# Patient Record
Sex: MALE | Race: WHITE | NOT HISPANIC OR LATINO | Employment: FULL TIME | URBAN - METROPOLITAN AREA
[De-identification: names, ages, dates, MRNs, and addresses within clinical notes are randomized per-mention and may not be internally consistent; named-entity substitution may affect disease eponyms.]

---

## 2018-04-10 ENCOUNTER — OFFICE VISIT - HEALTHEAST (OUTPATIENT)
Dept: FAMILY MEDICINE | Facility: CLINIC | Age: 58
End: 2018-04-10

## 2018-04-10 DIAGNOSIS — I10 HTN (HYPERTENSION): ICD-10-CM

## 2018-04-10 DIAGNOSIS — Z12.11 SCREEN FOR COLON CANCER: ICD-10-CM

## 2018-04-10 DIAGNOSIS — E78.5 HYPERLIPIDEMIA, UNSPECIFIED HYPERLIPIDEMIA TYPE: ICD-10-CM

## 2018-04-10 DIAGNOSIS — Z00.00 ROUTINE GENERAL MEDICAL EXAMINATION AT A HEALTH CARE FACILITY: ICD-10-CM

## 2018-04-10 LAB
CHOLEST SERPL-MCNC: 163 MG/DL
FASTING STATUS PATIENT QL REPORTED: YES
FASTING STATUS PATIENT QL REPORTED: YES
GLUCOSE BLD-MCNC: 101 MG/DL (ref 70–125)
HDLC SERPL-MCNC: 29 MG/DL
LDLC SERPL CALC-MCNC: 108 MG/DL
PSA SERPL-MCNC: 2.6 NG/ML (ref 0–3.5)
TRIGL SERPL-MCNC: 131 MG/DL

## 2018-04-10 ASSESSMENT — MIFFLIN-ST. JEOR: SCORE: 1899.88

## 2018-04-12 ENCOUNTER — COMMUNICATION - HEALTHEAST (OUTPATIENT)
Dept: FAMILY MEDICINE | Facility: CLINIC | Age: 58
End: 2018-04-12

## 2018-04-13 ENCOUNTER — COMMUNICATION - HEALTHEAST (OUTPATIENT)
Dept: FAMILY MEDICINE | Facility: CLINIC | Age: 58
End: 2018-04-13

## 2018-04-13 DIAGNOSIS — N52.9 ED (ERECTILE DYSFUNCTION): ICD-10-CM

## 2018-05-24 ENCOUNTER — AMBULATORY - HEALTHEAST (OUTPATIENT)
Dept: NURSING | Facility: CLINIC | Age: 58
End: 2018-05-24

## 2020-08-10 ENCOUNTER — COMMUNICATION - HEALTHEAST (OUTPATIENT)
Dept: SCHEDULING | Facility: CLINIC | Age: 60
End: 2020-08-10

## 2020-08-12 ENCOUNTER — COMMUNICATION - HEALTHEAST (OUTPATIENT)
Dept: SCHEDULING | Facility: CLINIC | Age: 60
End: 2020-08-12

## 2020-08-19 ENCOUNTER — RECORDS - HEALTHEAST (OUTPATIENT)
Dept: ADMINISTRATIVE | Facility: OTHER | Age: 60
End: 2020-08-19

## 2020-08-24 ENCOUNTER — RECORDS - HEALTHEAST (OUTPATIENT)
Dept: ADMINISTRATIVE | Facility: OTHER | Age: 60
End: 2020-08-24

## 2020-08-25 ENCOUNTER — RECORDS - HEALTHEAST (OUTPATIENT)
Dept: ADMINISTRATIVE | Facility: OTHER | Age: 60
End: 2020-08-25

## 2020-08-28 ENCOUNTER — RECORDS - HEALTHEAST (OUTPATIENT)
Dept: ADMINISTRATIVE | Facility: OTHER | Age: 60
End: 2020-08-28

## 2020-09-09 ENCOUNTER — RECORDS - HEALTHEAST (OUTPATIENT)
Dept: ADMINISTRATIVE | Facility: OTHER | Age: 60
End: 2020-09-09

## 2020-09-10 ENCOUNTER — AMBULATORY - HEALTHEAST (OUTPATIENT)
Dept: FAMILY MEDICINE | Facility: CLINIC | Age: 60
End: 2020-09-10

## 2020-09-10 RX ORDER — GUAIFENESIN 600 MG/1
600 TABLET, EXTENDED RELEASE ORAL
Status: SHIPPED | COMMUNITY
Start: 2020-09-08 | End: 2021-07-30

## 2020-09-10 RX ORDER — CALCIUM CARBONATE 500(1250)
500 TABLET ORAL
Status: SHIPPED | COMMUNITY
Start: 2020-08-25 | End: 2021-07-30

## 2020-09-10 RX ORDER — PANTOPRAZOLE SODIUM 40 MG/1
40 TABLET, DELAYED RELEASE ORAL
Status: SHIPPED | COMMUNITY
Start: 2020-09-08 | End: 2021-07-30

## 2020-09-10 RX ORDER — POLYETHYLENE GLYCOL 3350 17 G/17G
17 POWDER, FOR SOLUTION ORAL
Status: SHIPPED | COMMUNITY
Start: 2020-09-08 | End: 2021-07-30

## 2020-09-10 RX ORDER — MIRTAZAPINE 7.5 MG/1
7.5 TABLET, FILM COATED ORAL
Status: SHIPPED | COMMUNITY
Start: 2020-09-09 | End: 2021-07-30

## 2020-09-10 RX ORDER — DEXTROMETHORPHAN HBR. AND GUAIFENESIN 10; 100 MG/5ML; MG/5ML
10 SOLUTION ORAL PRN
Status: SHIPPED | COMMUNITY
Start: 2020-09-08 | End: 2021-07-30

## 2020-09-10 RX ORDER — ACETAMINOPHEN 325 MG/1
325-650 TABLET ORAL PRN
Status: SHIPPED | COMMUNITY
Start: 2020-08-24 | End: 2022-11-14

## 2020-09-11 ENCOUNTER — OFFICE VISIT - HEALTHEAST (OUTPATIENT)
Dept: FAMILY MEDICINE | Facility: CLINIC | Age: 60
End: 2020-09-11

## 2020-09-11 DIAGNOSIS — I77.810 MILD ASCENDING AORTA DILATATION (H): ICD-10-CM

## 2020-09-11 DIAGNOSIS — E78.5 HYPERLIPIDEMIA, UNSPECIFIED HYPERLIPIDEMIA TYPE: ICD-10-CM

## 2020-09-11 DIAGNOSIS — I10 ESSENTIAL HYPERTENSION: ICD-10-CM

## 2020-09-11 DIAGNOSIS — I35.1 MODERATE AORTIC REGURGITATION: ICD-10-CM

## 2020-09-11 DIAGNOSIS — S06.5XAA SDH (SUBDURAL HEMATOMA) (H): ICD-10-CM

## 2020-09-11 DIAGNOSIS — I63.9 ACUTE ISCHEMIC STROKE (H): ICD-10-CM

## 2020-09-11 DIAGNOSIS — I82.4Z2 ACUTE DEEP VEIN THROMBOSIS (DVT) OF DISTAL VEIN OF LEFT LOWER EXTREMITY (H): ICD-10-CM

## 2020-09-14 ENCOUNTER — COMMUNICATION - HEALTHEAST (OUTPATIENT)
Dept: FAMILY MEDICINE | Facility: CLINIC | Age: 60
End: 2020-09-14

## 2020-09-23 ENCOUNTER — OFFICE VISIT - HEALTHEAST (OUTPATIENT)
Dept: CARDIOLOGY | Facility: CLINIC | Age: 60
End: 2020-09-23

## 2020-09-23 DIAGNOSIS — I77.810 ASCENDING AORTA DILATION (H): ICD-10-CM

## 2020-09-23 DIAGNOSIS — I35.1 NONRHEUMATIC AORTIC VALVE INSUFFICIENCY: ICD-10-CM

## 2020-09-23 DIAGNOSIS — I10 ESSENTIAL HYPERTENSION: ICD-10-CM

## 2020-09-23 DIAGNOSIS — E78.5 DYSLIPIDEMIA, GOAL LDL BELOW 100: ICD-10-CM

## 2020-09-23 RX ORDER — CARVEDILOL 25 MG/1
25 TABLET ORAL 2 TIMES DAILY
Qty: 60 TABLET | Refills: 11 | Status: SHIPPED | OUTPATIENT
Start: 2020-09-23 | End: 2021-10-20

## 2020-10-12 ENCOUNTER — OFFICE VISIT - HEALTHEAST (OUTPATIENT)
Dept: FAMILY MEDICINE | Facility: CLINIC | Age: 60
End: 2020-10-12

## 2020-10-12 DIAGNOSIS — I10 ESSENTIAL HYPERTENSION: ICD-10-CM

## 2020-10-12 DIAGNOSIS — S06.5XAA SDH (SUBDURAL HEMATOMA) (H): ICD-10-CM

## 2020-10-12 DIAGNOSIS — I63.9 ACUTE ISCHEMIC STROKE (H): ICD-10-CM

## 2020-10-12 DIAGNOSIS — I82.4Z2 ACUTE DEEP VEIN THROMBOSIS (DVT) OF DISTAL VEIN OF LEFT LOWER EXTREMITY (H): ICD-10-CM

## 2020-10-12 DIAGNOSIS — I35.1 MODERATE AORTIC REGURGITATION: ICD-10-CM

## 2020-10-12 DIAGNOSIS — I77.810 MILD ASCENDING AORTA DILATATION (H): ICD-10-CM

## 2020-10-12 DIAGNOSIS — E78.5 HYPERLIPIDEMIA, UNSPECIFIED HYPERLIPIDEMIA TYPE: ICD-10-CM

## 2020-11-05 ENCOUNTER — COMMUNICATION - HEALTHEAST (OUTPATIENT)
Dept: FAMILY MEDICINE | Facility: CLINIC | Age: 60
End: 2020-11-05

## 2020-11-05 DIAGNOSIS — I63.9 ACUTE ISCHEMIC STROKE (H): ICD-10-CM

## 2020-11-05 RX ORDER — AMLODIPINE BESYLATE 5 MG/1
5 TABLET ORAL 2 TIMES DAILY
Qty: 180 TABLET | Refills: 3 | Status: SHIPPED | OUTPATIENT
Start: 2020-11-05 | End: 2021-07-30

## 2020-11-05 RX ORDER — ATORVASTATIN CALCIUM 40 MG/1
80 TABLET, FILM COATED ORAL DAILY
Qty: 180 TABLET | Refills: 3 | Status: SHIPPED | OUTPATIENT
Start: 2020-11-05 | End: 2021-07-30

## 2020-11-08 ENCOUNTER — COMMUNICATION - HEALTHEAST (OUTPATIENT)
Dept: FAMILY MEDICINE | Facility: CLINIC | Age: 60
End: 2020-11-08

## 2020-11-08 DIAGNOSIS — E78.5 HYPERLIPIDEMIA, UNSPECIFIED HYPERLIPIDEMIA TYPE: ICD-10-CM

## 2020-11-09 RX ORDER — ATORVASTATIN CALCIUM 80 MG/1
80 TABLET, FILM COATED ORAL AT BEDTIME
Qty: 90 TABLET | Refills: 3 | Status: SHIPPED | OUTPATIENT
Start: 2020-11-09 | End: 2021-10-20

## 2020-11-20 ENCOUNTER — RECORDS - HEALTHEAST (OUTPATIENT)
Dept: VASCULAR ULTRASOUND | Facility: CLINIC | Age: 60
End: 2020-11-20

## 2020-11-20 DIAGNOSIS — I82.4Z2 ACUTE EMBOLISM AND THROMBOSIS OF UNSPECIFIED DEEP VEINS OF LEFT DISTAL LOWER EXTREMITY (H): ICD-10-CM

## 2021-03-28 ENCOUNTER — COMMUNICATION - HEALTHEAST (OUTPATIENT)
Dept: FAMILY MEDICINE | Facility: CLINIC | Age: 61
End: 2021-03-28

## 2021-05-26 ENCOUNTER — RECORDS - HEALTHEAST (OUTPATIENT)
Dept: ADMINISTRATIVE | Facility: CLINIC | Age: 61
End: 2021-05-26

## 2021-06-01 VITALS — BODY MASS INDEX: 34.36 KG/M2 | WEIGHT: 240 LBS | HEIGHT: 70 IN

## 2021-06-05 VITALS
DIASTOLIC BLOOD PRESSURE: 72 MMHG | OXYGEN SATURATION: 99 % | BODY MASS INDEX: 35.15 KG/M2 | SYSTOLIC BLOOD PRESSURE: 130 MMHG | WEIGHT: 252 LBS | HEART RATE: 62 BPM

## 2021-06-05 VITALS
OXYGEN SATURATION: 97 % | SYSTOLIC BLOOD PRESSURE: 142 MMHG | HEART RATE: 71 BPM | DIASTOLIC BLOOD PRESSURE: 72 MMHG | BODY MASS INDEX: 33.89 KG/M2 | RESPIRATION RATE: 18 BRPM | WEIGHT: 243 LBS

## 2021-06-05 VITALS
WEIGHT: 248 LBS | HEART RATE: 72 BPM | SYSTOLIC BLOOD PRESSURE: 144 MMHG | OXYGEN SATURATION: 98 % | BODY MASS INDEX: 34.59 KG/M2 | DIASTOLIC BLOOD PRESSURE: 76 MMHG

## 2021-06-10 NOTE — TELEPHONE ENCOUNTER
Pt's significant other, Katiana, calls on his behalf. Consent to communicate with Katiana is in the chart.    Concerned about sudden inability to swallow. Patient woke up around 4am feeling congested in the nose/throat area. He denies airway swelling or trouble breathing. Does not have respiratory difficulty at this time. The main issue is severe inability to swallow. The patient is unable to swallow anything. Cannot even swallow a sip of water. Is having trouble managing his own saliva secretions.    I directed Katiana to take patient to the ER now to be seen. The patient should be assessed promptly for his symptoms. Katiana verbalizes understanding and will take patient to the ER to be evaluated.    Soila Roberts RN    Reason for Disposition    SEVERE difficulty swallowing (e.g., drooling or spitting, can't swallow water)    Additional Information    Negative: [1] Severe difficulty swallowing (e.g., drooling or spitting) AND [2] started suddenly after taking a medicine or allergic food    Negative: Wheezing, stridor, hoarseness, or difficulty breathing    Negative: [1] Swollen tongue AND [2] sudden onset    Negative: Sounds like a life-threatening emergency to the triager    Protocols used: SWALLOWING DIFFICULTY-A-AH

## 2021-06-11 NOTE — TELEPHONE ENCOUNTER
Forms Request  Name of form/paperwork: Other:  Healthcare certification form  Have you been seen for this request: Yes:  9/11/20  Do we have the form: No. This form will be faxed to [facility].  When is form needed by: non-urgent  How would you like the form returned:   Patient Notified form requests are processed in 3-5 business days: Yes    Okay to leave a detailed message? No

## 2021-06-11 NOTE — PROGRESS NOTES
Hospital Follow-up Visit:    Assessment/Plan:     Irving was seen today for hospital visit follow up and hypertension.    Acute ischemic stroke (H)    SDH (subdural hematoma) (H)    Acute deep vein thrombosis (DVT) of distal vein of left lower extremity (H)    Essential hypertension    Hyperlipidemia, unspecified hyperlipidemia type    Moderate aortic regurgitation    Mild ascending aorta dilatation (H)           Subjective:     Irving Baca is a 60 y.o. male who presents for a hospital discharge follow up.  Patient hospitalized at [facility] on August 10 and ultimately diagnosed with stroke in the left medulla.  Discharged to inpatient rehabilitation at [facility] on August 19.  Patient subsequently developed signs and symptoms of subdural hematoma.  At the same time was diagnosed with DVT in the left leg.  His aspirin and Plavix were stopped.  He was started on heparin.  His clot remained stable.  He was seen by neurosurgery.  Ultimately reached a point where he was discharged home on September 9, 2020.    Needs follow-up lab testing but labs have been stable up until just a few days ago do not feel he needs labs today.  Will need follow-up ultrasound to ensure stability, they state they will be contacted by the hospital.  Discussed the importance of monitoring the situation and seeking immediate evaluation should signs or symptoms of worsening including development of pulmonary embolus occur.  No concerning symptoms today.  Has follow-up scheduled with cardiology, neurology, neurosurgery and rehabilitation.  Was found incidentally to have aortic regurgitation on echocardiogram obtained August 11, 2020 that is the reason for the cardiology follow-up.    He is currently on aspirin therapy alone.  Plavix was recommended to be discontinued permanently given his subdural hematoma.  As stated was treated with heparin during the course of his hospital stay but was discontinued upon discharge.    We  spent time today discussing his clinical course as we reviewed records from his prolonged and complex ordeal.    At this time patient reports that he feels well.  His cognition seems to be slowed to some extent and his speech is somewhat more hesitant.  No new issues have arisen.    Hospital/Nursing Home/IP Rehab Facility: [facility]  Date of Admission: 08/10/2020  Date of Discharge:9/9/2020  Reason(s) for Admission: Stroke, subdural hematoma, DVT.            Do you have any problems taking your medication regularly?  None       Have you had any changes in your medication since discharge? None       Have you had any difficulty following your discharge or treatment plan?  No    Summary of hospitalization:  Hospital discharge summary reviewed  Diagnostic Tests/Treatments reviewed.  Follow up needed: None  Other Healthcare Providers Involved in Patient's Care: Patient Care Team:  Chance Del Toro MD as PCP - General  Chance Del Toro MD as Assigned PCP      Update since discharge: {improved       Post Discharge Medication Reconciliation: discharge medications reconciled, continue medications without change  Plan of care communicated with: patient and significant other    Objective:     Vitals:    09/11/20 1511   BP: 144/76   Pulse: 72   SpO2: 98%   Weight: (!) 248 lb (112.5 kg)         Physical Exam:        General Appearance:    Alert, cooperative, no distress   Eyes:   No scleral icterus or conjunctival irritation       Ears:    Normal TM's and external ear canals, both ears   Throat:   Lips, mucosa, and tongue normal; teeth and gums normal   Neck:   Supple, symmetrical, trachea midline, no adenopathy;        thyroid:  No enlargement/tenderness/nodules   Lungs:     Clear to auscultation bilaterally, respirations unlabored, no wheezes or crackles   Heart:    Regular rate and rhythm,  No murmur   Abdomen:    Soft, no distention, no tenderness on palpation, no masses, no organomegaly      Extremities:  No edema, no joint swelling or redness, no evidence of any injuries   Skin:  No concerning skin findings, no suspicious moles, no rashes   Neurologic:  On gross examination there is no motor or sensory deficit.  Patient walks with a normal gait                   Coding guidelines for this visit:  Type of Medical   Decision Making Face-to-Face Visit       within 7 Days of discharge Face-to-Face Visit        within 14 days of discharge   Moderate Complexity [id number]   High Complexity [id number]       Electronically signed by Chance Del Toro MD 09/20/20 3:26 PM

## 2021-06-12 NOTE — PROGRESS NOTES
Patient ID: Irving Baca is a 60 y.o. male.  /72   Pulse 62   Wt (!) 252 lb (114.3 kg)   SpO2 99%   BMI 35.15 kg/m      Assessment/Plan:                   Diagnoses and all orders for this visit:    Acute ischemic stroke (H)    SDH (subdural hematoma) (H)    Acute deep vein thrombosis (DVT) of distal vein of left lower extremity (H)    Essential hypertension    Hyperlipidemia, unspecified hyperlipidemia type    Moderate aortic regurgitation    Mild ascending aorta dilatation (H)          DISCUSSION  See discussion below regarding complex medical diagnoses.    Await recommendations of physical medicine and rehabilitation specialist regarding return to work and driving.  I can help with documentation if it is necessary he needs to contact me if this is the case.    Continue to follow specialty providers as previously recommended.  Schedule ultrasound to reevaluate DVT as discussed in detail below.  Subjective:     HPI    Irving Baca is a 60 y.o. male with a complex medical history that includes acute ischemic stroke on August 10, 2020.  During the course of his hospital and rehabilitation stay he developed subdural hematoma while on aspirin and Plavix.  He was also found concurrently at the time of his diagnosis of SDH to have an acute left lower extremity DVT.  He was able to be treated with heparin during his hospital stay but that was discontinued at the time of discharge.  He is now attending outpatient rehabilitation.  His symptoms from his stroke include right side arm and leg sensory disturbance.  He also has tingling that he describes as occurring on the left side of the face.  There is concerned that his speech may be affected however patient is reporting that his speech difficulties are chronic and longstanding since childhood and he does not feel there is any difference.  How mentation was affected is also been in question.  On his interviews with me there are some considerations that  suggest memory difficulty, it is difficult to discern if this is related to his stroke or if this is related to undergoing over a longer period of time multiple complex medical considerations.    He is currently attending outpatient rehabilitation.  He will be seeing on October 14 his physical medicine and rehabilitation specialist.  The main purpose of our visit today was to discuss considerations related to returning to work and regarding driving.  Patient had brought up questions for me and sent forms.  It was unclear to me the process that was being undertaken by his specialty providers.  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  He feels he is ready to return.  He feels he is engaged in activities including experimenting with some of his drafting computer tools that he feels he would be ready to return.  We will defer this decision for the time being to his rehabilitation specialist as well as return to driving.  We did note that he had some visual testing done at the request of his rehabilitation specialist.  He brought some records and reviewed those briefly today.  I am certainly happy to help and asked him to contact me if he needs any additional advice regarding return to work or driving.    We reviewed his recent cardiology visit regarding aortic regurgitation and dilated a sending aorta.  We noted he will be scheduled for an MRI.  He was hypertensive at his follow-up with me and again with the cardiologist.  His carvedilol was increased.  His blood pressure is better today.    He had follow-up with neurosurgery regarding subdural hematoma.  Scan indicates that the subdural hematoma is resolved.    He was to have follow-up on his DVT.  As I discussed with him undergoing an ultrasound he became somewhat frustrated.  He spent several minutes paging through his records that he brought with him being certain that he  had an ultrasound.  It turns out he was thinking of his CT scan of the head.  He feels his leg is better overall.  He does have some mild lower extremity edema which may very well be chronic.  There is also a reddish hue to both lower extremities that is also likely chronic.  We discussed the importance of follow-up in this regard we will schedule an ultrasound.  Review of Systems  Complete review of systems is obtained.  Other than the specific considerations noted above complete review of systems is negative.          Objective:   Medications:  Current Outpatient Medications   Medication Sig     acetaminophen (TYLENOL) 325 MG tablet Take 325-650 mg by mouth.     amLODIPine (NORVASC) 5 MG tablet Take 1 tablet (5 mg total) by mouth daily. (Patient taking differently: Take 5 mg by mouth 2 (two) times a day. )     aspirin 81 MG EC tablet Take 81 mg by mouth see administration instructions. Two to three times per week at bedtime.     atorvastatin (LIPITOR) 80 MG tablet Take 1 tablet (80 mg total) by mouth daily.     calcium, as carbonate, (OS-FREYA) 500 mg calcium (1,250 mg) tablet Take 500 mg by mouth.     carvediloL (COREG) 25 MG tablet Take 1 tablet (25 mg total) by mouth 2 (two) times a day.     fluticasone propionate (FLONASE) 50 mcg/actuation nasal spray 2 sprays into each nostril as needed.      guaiFENesin ER (MUCINEX) 600 mg 12 hr tablet Take 600 mg by mouth.     mirtazapine (REMERON) 7.5 MG tablet Take 7.5 mg by mouth.     multivitamin with minerals (SUPER THERA SHANTELL M) tablet Take 1 tablet by mouth.     pantoprazole (PROTONIX) 40 MG tablet Take 40 mg by mouth.     polyethylene glycol (MIRALAX) 17 gram packet Take 17 g by mouth.     sodium chloride (OCEAN) 0.65 % nasal spray 2 sprays into each nostril.     walker Misc Walker with front wheels for home use.     dextromethorphan-guaiFENesin (ROBITUSSIN-DM)  mg/5 mL liquid Take 10 mL by mouth as needed.        Allergies:  Allergies   Allergen Reactions      Lisinopril Cough       Tobacco:   reports that he has never smoked. He has never used smokeless tobacco.     Physical Exam          /72   Pulse 62   Wt (!) 252 lb (114.3 kg)   SpO2 99%   BMI 35.15 kg/m          General: Patient had no signs of distress.  He is a bit repetitive in his discussion.  He does have some stuttering and what might be discernible as word finding difficulty, the chronicity of this is somewhat uncertain see above.    Heart: Regular rate and rhythm no murmur    Lungs: Good air movement throughout no wheeze or crackle    Extremities: Mild lower extremity edema nonpitting, pink hue to the lower extremities not resembling acute process likely chronic.  No pain on palpation of the posterior calf musculature.

## 2021-06-12 NOTE — TELEPHONE ENCOUNTER
Medication Question or Clarification  Who is calling: The patient's friend, Katiana  What medication are you calling about (include dose and sig)?:        atorvastatin (LIPITOR) 80 MG tablet   Who prescribed the medication?: Chance Del Toro MD   What is your question/concern?: The caller is requesting the Atorvastatin RX be changed to 40MG, 2 tablets because the 80MG tablets are difficult to swallow.  Requested Pharmacy: [facility]  Okay to leave a detailed message?: Yes

## 2021-06-12 NOTE — TELEPHONE ENCOUNTER
Medication Question or Clarification  Who is calling: Pharmacy fax  What medication are you calling about (include dose and sig)?:   atorvastatin (LIPITOR) 40 MG tablet 180 tablet 3 11/5/2020  --   Sig - Route: Take 2 tablets (80 mg total) by mouth daily. - Oral       Who prescribed the medication?: Chance Del Toro MD  What is your question/concern?: Insurance will not cover two tablets per day.  Please send a new prescription for 80 mg tablets.  Requested Pharmacy: [facility]  Okay to leave a detailed message?: Yes

## 2021-06-12 NOTE — TELEPHONE ENCOUNTER
Refill Request  Did you contact pharmacy: No  Medication name:   Requested Prescriptions     Pending Prescriptions Disp Refills     atorvastatin (LIPITOR) 80 MG tablet  0     Sig: Take 1 tablet (80 mg total) by mouth daily.     amLODIPine (NORVASC) 5 MG tablet  0     Sig: Take 1 tablet (5 mg total) by mouth daily.     Who prescribed the medication: Chance Del Toro MD   Requested Pharmacy: [facility]  Is patient out of medication: No.  5 days left  Patient notified refills processed in 3 business days:  yes  Okay to leave a detailed message: yes

## 2021-06-16 PROBLEM — R13.10 DYSPHAGIA: Status: ACTIVE | Noted: 2020-08-10

## 2021-06-16 PROBLEM — M79.662 PAIN OF LEFT CALF: Status: ACTIVE | Noted: 2020-08-19

## 2021-06-16 PROBLEM — I35.1 NONRHEUMATIC AORTIC VALVE INSUFFICIENCY: Status: ACTIVE | Noted: 2020-09-23

## 2021-06-16 PROBLEM — I63.9 ACUTE ISCHEMIC STROKE (H): Status: ACTIVE | Noted: 2020-08-10

## 2021-06-16 PROBLEM — I82.4Z2 DEEP VEIN THROMBOSIS (DVT) OF DISTAL VEIN OF LEFT LOWER EXTREMITY (H): Status: ACTIVE | Noted: 2020-09-05

## 2021-06-16 PROBLEM — I10 ESSENTIAL HYPERTENSION: Status: ACTIVE | Noted: 2020-08-19

## 2021-06-16 PROBLEM — S06.5XAA SDH (SUBDURAL HEMATOMA) (H): Status: ACTIVE | Noted: 2020-08-24

## 2021-06-16 PROBLEM — I77.810 ASCENDING AORTA DILATION (H): Status: ACTIVE | Noted: 2020-08-19

## 2021-06-16 PROBLEM — N52.9 ED (ERECTILE DYSFUNCTION): Status: ACTIVE | Noted: 2018-04-14

## 2021-06-16 PROBLEM — R26.89 IMPAIRED GAIT AND MOBILITY: Status: ACTIVE | Noted: 2020-08-19

## 2021-06-16 PROBLEM — I35.1 MODERATE AORTIC REGURGITATION: Status: ACTIVE | Noted: 2020-08-19

## 2021-06-17 NOTE — PROGRESS NOTES
" Patient ID: Irving Baca is a 57 y.o. male.  /80  Pulse 83  Resp 16  Ht 5' 10\" (1.778 m)  Wt (!) 240 lb (108.9 kg)  SpO2 99%  BMI 34.44 kg/m2    Assessment/Plan:                   Diagnoses and all orders for this visit:    Routine general medical examination at a health care facility  -     Lipid Cascade  -     PSA, Annual Screen (Prostatic-Specific Antigen)  -     Glucose    HTN (hypertension)    Screen for colon cancer  -     Ambulatory referral for Colonoscopy    Hyperlipidemia, unspecified hyperlipidemia type    Other orders  -     Cancel: Basic Metabolic Panel           DISCUSSION  She will colonoscopy.  Obtain labs.  Return for recheck of blood pressure.  Hold off on any consideration of blood pressure treatment at this time.  Subjective:     HPI    Irving Baca his medical history includes hypertension and hyperlipidemia.  Patient states that his stress level has improved drastically since he is now working from home.  He states that his weight has gone down and he has worked to improve his diet and try to maintain physical activity.  His weight is noted to be decreased from his last visit.  His blood pressure is much more reasonable than previous readings.  He is not on treatment.  Previous treatment had been recommended with losartan patient states he took medication perhaps for a short period of time he is uncertain.  Based on his blood pressure reading today and his history we discussed it is reasonable to continue to monitor closely and determine if we need to reinitiate medication therapy.  We discussed re-checking lab tests to further evaluate his overall vascular disease risk.  Previous cholesterol readings have been somewhat high.  He has not been on cholesterol lowering medication.  Patient does take a baby aspirin daily but admits to forgetting often.  He denies chest pain or shortness of breath.  Discussed other routine health prevention as noted.  He is overdue for " colonoscopy.      Review of Systems  Complete review of systems is obtained.  Other than the specific considerations noted above complete review of systems is negative.          Objective:   Medications:  Current Outpatient Prescriptions   Medication Sig     aspirin 81 MG EC tablet Take 81 mg by mouth daily. Takes 2-3x per week     cholecalciferol, vitamin D3, (VITAMIN D3) 1,000 unit capsule Take 1,000 Units by mouth daily.     niacin 250 MG tablet Take 250 mg by mouth daily with breakfast.     sildenafil (REVATIO) 20 mg tablet Take 1 tablet (20 mg total) as needed for erectile dysfunction     losartan (COZAAR) 25 MG tablet Take 1 tablet (25 mg total) by mouth daily.       Allergies:  Allergies   Allergen Reactions     Lisinopril Cough       Tobacco:   reports that he has never smoked. He has never used smokeless tobacco.    HEALTH PREVENTION    General  Dental care: Discussed the importance of regular dental care.  Eye care: Discussed importance of routine eye exams for glaucoma screening  Exercise: Discussed ways he can increase the overall amount of exercise  Diet: Discussed ways he can improve his diet    Wt Readings from Last 3 Encounters:   04/10/18 (!) 240 lb (108.9 kg)   06/20/16 (!) 246 lb (111.6 kg)   04/30/15 (!) 250 lb 3.2 oz (113.5 kg)     Body mass index is 34.44 kg/(m^2).    The following high BMI interventions were performed this visit: encouragement to exercise    Cancer screening  Testicular cancer:is discussed and exam performed today  Skin cancer: Discussed sun burn prevention and self monitoring.  Colon cancer: Colon cancer screening is discussed.  Discussed referral for colonoscopy  Prostate cancer: Discussed continued utilization of PSA and digital rectal exam for screening    Cholesterol:   LDL Calculated (mg/dL)   Date Value   06/20/2016 132 (H)   04/30/2015 134 (H)   03/09/2012 145 (H)      Blood Pressure:   BP Readings from Last 3 Encounters:   04/10/18 138/80   06/20/16 166/88   04/30/15  "(!) 164/96     Immunization History   Administered Date(s) Administered     DT (pediatric) 10/08/2002     Td,adult,historic,unspecified 10/08/2002     Tdap 04/30/2015     There are no preventive care reminders to display for this patient.     Physical Exam      /80  Pulse 83  Resp 16  Ht 5' 10\" (1.778 m)  Wt (!) 240 lb (108.9 kg)  SpO2 99%  BMI 34.44 kg/m2    General Appearance:    Alert, cooperative, no distress, appears stated age   Head:    Normocephalic, without obvious abnormality, atraumatic   Eyes:   No scleral icterus or conjunctival irritation       Ears:    Normal TM's and external ear canals, both ears   Nose:   Nares normal, septum midline, mucosa normal, no drainage    or sinus tenderness   Throat:   Lips, mucosa, and tongue normal; teeth and gums normal   Neck:   Supple, symmetrical, trachea midline, no adenopathy;        thyroid:  No enlargement/tenderness/nodules   Lungs:     Clear to auscultation bilaterally, respirations unlabored   Heart:    Regular rate and rhythm, no murmur, rub  or gallop   Abdomen:     Soft, non-tender, bowel sounds active all four quadrants,     no masses, no organomegaly   Genitalia:   Normal testicular anatomy no inguinal hernias, tinea rash in the skin without secondary infection.  Appears to be improving   Rectal:   Smooth uniform consistency of the prostate no nodules or other abnormalities   Extremities:   Extremities normal, atraumatic, no cyanosis or edema   Pulses:   2+ and symmetric all extremities   Skin:   Skin color, texture, turgor normal, no rashes or lesions   Neurologic:   CNII-XII intact. Normal strength, sensation                         "

## 2021-06-18 NOTE — PROGRESS NOTES
I met with Irving Baca at the request of Dr Del Toro to recheck his blood pressure.  Blood pressure medications on the MAR were reviewed with patient.    Patient has taken all medications as per usual regimen: Yes no medication  Patient reports tolerating them without any issues or concerns: Yes    Vitals:    05/24/18 1305 05/24/18 1307   BP: 142/80 144/78   Pulse: 80    SpO2: 99%        144/80, 142/78

## 2021-06-27 ENCOUNTER — HEALTH MAINTENANCE LETTER (OUTPATIENT)
Age: 61
End: 2021-06-27

## 2021-06-29 NOTE — PROGRESS NOTES
Progress Notes by Marleen Ryan MD at 9/23/2020 12:50 PM     Author: Marleen Ryan MD Service: -- Author Type: Physician    Filed: 9/23/2020  3:46 PM Encounter Date: 9/23/2020 Status: Signed    : Marleen Ryan MD (Physician)           Click to link to [facility] NOTE    Thank you, Dr. Del Toro, for asking me to see Irving Baca in consultation at [facility] to evaluate aortic valve regurgitation and dilated ascending aorta.      Assessment/Plan:   1.  Moderate aortic valve regurgitation, mildly dilated aortic root: The patient was fine to have moderate aortic valve regurgitation and dilated aortic root.  We discussed further evaluation and management.  Cardiac MRA is requested for evaluation of #1 thoracic aorta to rule out aortic aneurysm caused aortic valve regurgitation, #2 the severity of aortic valve regurgitation and the aortic valve morphology, #3 heart function and structure.  Patient and his wife agreed with the plan.    2.  Essential hypertension: Her blood pressure is not well controlled.  Increase carvedilol from 18.5 mg twice a day to 25 mg twice a day, continue amlodipine 5 mg twice a day.    3.  Dyslipidemia: Continue Lipitor 80 mg at bedtime.    4.  Ischemic stroke secondary to high-grade narrowing of intracranial vertebral arteries: Follow-up with neurology clinic.    Thank you for the opportunity to be involved in the care of Irving Baca. If you have any questions, please feel free to contact me.  I will see the patient again in 1 year and as needed.    Much or all of the text in this note was generated through the use of Dragon Dictate voice-to-text software. Errors in spelling or words which seem out of context are unintentional.   Sound alike errors, in particular, may have escaped editing.       History of Present Illness:   It is my pleasure to see Irving Baca at the [facility] for evaluation of Consult. Irving LUNDY  Phuong is a 60 y.o. male with a medical history of essential hypertension, dyslipidemia, history of DVT of left lower extremity, ischemic stroke.    The patient is referred to cardiology clinic for evaluation of eccentric moderate aortic valve regurgitation and dilated ascending aorta post ischemic stroke.    The patient states that he had no chest pain, shortness of breath, palpitations, orthopnea, PND or leg edema.  He has occasional lightheadedness and dizziness, no syncope.  He recovered pretty well from his ischemic or stroke.  His blood pressure is mildly high.  His heart rate is controlled well.    Past Medical History:     Patient Active Problem List   Diagnosis   ? Hyperlipidemia   ? Prehypertension   ? ED (erectile dysfunction)   ? Dysphagia   ? Acute ischemic stroke (H)   ? SDH (subdural hematoma) (H)   ? Pain of left calf   ? Moderate aortic regurgitation   ? Mild ascending aorta dilatation (H)   ? Impaired gait and mobility   ? Deep vein thrombosis (DVT) of distal vein of left lower extremity (H)   ? Essential hypertension       Past Surgical History:   History reviewed. No pertinent surgical history.    Family History:   Reviewed: No family history of CAD or aortic aneurysm.    Social History:    reports that he has never smoked. He has never used smokeless tobacco. He reports current alcohol use. He reports that he does not use drugs.    Review of Systems:   General: WNL  Eyes: Visual Distubance  Ears/Nose/Throat: WNL  Lungs: WNL  Heart: WNL  Stomach: Constipation  Bladder: WNL  Muscle/Joints: Muscle Weakness  Skin: WNL  Nervous System: Dizziness, Loss of Balance  Mental Health: WNL     Blood: WNL    Meds:     Current Outpatient Medications:   ?  amLODIPine (NORVASC) 5 MG tablet, Take 1 tablet (5 mg total) by mouth daily. (Patient taking differently: Take 5 mg by mouth 2 (two) times a day. ), Disp:  , Rfl: 0  ?  aspirin 81 MG EC tablet, Take 81 mg by mouth see administration instructions. Two to  three times per week at bedtime., Disp: , Rfl:   ?  atorvastatin (LIPITOR) 80 MG tablet, Take 1 tablet (80 mg total) by mouth daily., Disp:  , Rfl: 0  ?  calcium, as carbonate, (OS-FREYA) 500 mg calcium (1,250 mg) tablet, Take 500 mg by mouth., Disp: , Rfl:   ?  carvediloL (COREG) 25 MG tablet, Take 1 tablet (25 mg total) by mouth 2 (two) times a day., Disp: 60 tablet, Rfl: 11  ?  dextromethorphan-guaiFENesin (ROBITUSSIN-DM)  mg/5 mL liquid, Take 10 mL by mouth as needed. , Disp: , Rfl:   ?  fluticasone propionate (FLONASE) 50 mcg/actuation nasal spray, 2 sprays into each nostril as needed. , Disp: , Rfl:   ?  mirtazapine (REMERON) 7.5 MG tablet, Take 7.5 mg by mouth., Disp: , Rfl:   ?  multivitamin with minerals (SUPER THERA SHANTELL M) tablet, Take 1 tablet by mouth., Disp: , Rfl:   ?  pantoprazole (PROTONIX) 40 MG tablet, Take 40 mg by mouth., Disp: , Rfl:   ?  acetaminophen (TYLENOL) 325 MG tablet, Take 325-650 mg by mouth., Disp: , Rfl:   ?  guaiFENesin ER (MUCINEX) 600 mg 12 hr tablet, Take 600 mg by mouth., Disp: , Rfl:   ?  polyethylene glycol (MIRALAX) 17 gram packet, Take 17 g by mouth., Disp: , Rfl:   ?  sodium chloride (OCEAN) 0.65 % nasal spray, 2 sprays into each nostril., Disp: , Rfl:   ?  walker Misc, Walker with front wheels for home use., Disp: , Rfl:      Allergies:   Lisinopril    Objective:      Physical Exam  (!) 243 lb (110.2 kg)     Body mass index is 33.89 kg/m .  /72 (Patient Site: Right Arm, Patient Position: Sitting, Cuff Size: Adult Regular)   Pulse 71   Resp 18   Wt (!) 243 lb (110.2 kg)   SpO2 97%   BMI 33.89 kg/m      General Appearance:   Awake, Alert, No acute distress.   HEENT:  Pupil equal, reactive to light. No scleral icterus; the mucous membranes were moist. No oral ulcers or thrush.    Neck: No cervical bruits. No JVD. No thyromegaly. No lymph node enlargement or tenderness.   Chest: The spine was straight. The chest was symmetric.   Lungs:   Respirations unlabored.  Lungs are clear to auscultation. No crackles. No wheezing.   Cardiovascular:   RRR, normal first and second heart sounds with II/IV diastolic murmurs at RUSB. No rubs or gallops.    Abdomen:  Obese. Soft. No tenderness. Non-distended. Bowels sounds are present   Extremities: Equal posterior tibial pulses. No leg edema.   Skin: No rashes or ulcers. Warm, Dry.   Musculoskeletal: No tenderness. No deformity.   Neurologic: Mood and affect are appropriate. No focal deficits.         EKG:  Personally reivewed  Normal sinus rhythm   Cannot rule out Inferior infarct , age undetermined   Abnormal ECG   No previous ECGs available    Cardiac Imaging Studies  ECHO  On 8-:    Left Ventricle: Normal left ventricular size and systolic function.The estimated left ventricular ejection fraction is 55%. This represents a normal ejection fraction. Mild concentric hypertrophy noted. E/e' 8 to 15, which is equivocal for estimating LV filling pressures.Left ventricular diastolic function is normal.    The left ventricular wall motion is normal.    Right Ventricle: Normal right ventricular size and systolic function. TAPSE is normal, which is consistent with normal right ventricular systolic function.    Aortic Valve: The valve is tricuspid. No aortic stenosis. Moderate aortic regurgitation with an eccentrically directed jet and toward mitral valve.    Thoracic Aorta: The ascending aorta is mildly dilated.    No previous study for comparison.    Lab Review   Lab Results   Component Value Date     08/18/2020    K 3.8 08/18/2020     (H) 08/18/2020    CO2 25 08/18/2020    BUN 27 (H) 08/18/2020    CREATININE 1.18 08/18/2020    CALCIUM 8.3 (L) 08/18/2020     Lab Results   Component Value Date    WBC 7.1 08/10/2020    HGB 16.0 08/10/2020    HCT 46.5 08/10/2020    MCV 87 08/10/2020     08/16/2020     Lab Results   Component Value Date    CHOL 173 08/11/2020    TRIG 126 08/11/2020    HDL 34 (L) 08/11/2020     Lab Results    Component Value Date    TROPONINI <0.01 08/10/2020

## 2021-07-09 ENCOUNTER — COMMUNICATION - HEALTHEAST (OUTPATIENT)
Dept: ADMINISTRATIVE | Facility: CLINIC | Age: 61
End: 2021-07-09

## 2021-07-22 NOTE — LETTER
Letter by Marleen Ryan MD at      Author: Marleen Ryan MD Service: -- Author Type: --    Filed:  Encounter Date: 7/9/2021 Status: (Other)         Irving Baca  [address] [facility] [address]      July 9, 2021      Dear Irving,    This letter is to remind you that you will be due for your follow up appointment with Dr. Marleen Ryan in September, 2021. To help ensure you are in the best health possible, a regular follow-up with your cardiologist is essential.     Please call our Patient Scheduling Line at [phone] to schedule your appointment at your earliest convenience.  If you have recently scheduled an appointment, please disregard this letter.    We look forward to seeing you again. As always, we are available at the number  above for any questions or concerns you may have.      Sincerely,     The Physicians and Staff of [facility]

## 2021-07-29 ENCOUNTER — COMMUNICATION - HEALTHEAST (OUTPATIENT)
Dept: CARDIOLOGY | Facility: CLINIC | Age: 61
End: 2021-07-29

## 2021-07-30 ENCOUNTER — ANCILLARY PROCEDURE (OUTPATIENT)
Dept: GENERAL RADIOLOGY | Facility: CLINIC | Age: 61
End: 2021-07-30
Attending: FAMILY MEDICINE
Payer: COMMERCIAL

## 2021-07-30 ENCOUNTER — OFFICE VISIT (OUTPATIENT)
Dept: FAMILY MEDICINE | Facility: CLINIC | Age: 61
End: 2021-07-30
Payer: COMMERCIAL

## 2021-07-30 VITALS
DIASTOLIC BLOOD PRESSURE: 68 MMHG | WEIGHT: 274 LBS | BODY MASS INDEX: 38.36 KG/M2 | HEART RATE: 61 BPM | OXYGEN SATURATION: 97 % | HEIGHT: 71 IN | SYSTOLIC BLOOD PRESSURE: 132 MMHG

## 2021-07-30 DIAGNOSIS — E66.01 MORBID OBESITY (H): ICD-10-CM

## 2021-07-30 DIAGNOSIS — R63.5 WEIGHT GAIN: ICD-10-CM

## 2021-07-30 DIAGNOSIS — R06.09 DOE (DYSPNEA ON EXERTION): ICD-10-CM

## 2021-07-30 DIAGNOSIS — I10 ESSENTIAL HYPERTENSION: ICD-10-CM

## 2021-07-30 DIAGNOSIS — S06.5XAA SDH (SUBDURAL HEMATOMA) (H): ICD-10-CM

## 2021-07-30 DIAGNOSIS — I63.9 ACUTE ISCHEMIC STROKE (H): ICD-10-CM

## 2021-07-30 DIAGNOSIS — E78.5 HYPERLIPIDEMIA, UNSPECIFIED HYPERLIPIDEMIA TYPE: ICD-10-CM

## 2021-07-30 DIAGNOSIS — I77.810 MILD ASCENDING AORTA DILATATION (H): ICD-10-CM

## 2021-07-30 DIAGNOSIS — Z00.00 ROUTINE HISTORY AND PHYSICAL EXAMINATION OF ADULT: Primary | ICD-10-CM

## 2021-07-30 DIAGNOSIS — R60.9 EDEMA, UNSPECIFIED TYPE: ICD-10-CM

## 2021-07-30 DIAGNOSIS — I35.1 MODERATE AORTIC REGURGITATION: ICD-10-CM

## 2021-07-30 DIAGNOSIS — Z86.718 PERSONAL HISTORY OF DVT (DEEP VEIN THROMBOSIS): ICD-10-CM

## 2021-07-30 LAB
ALBUMIN SERPL-MCNC: 4 G/DL (ref 3.5–5)
ALP SERPL-CCNC: 110 U/L (ref 45–120)
ALT SERPL W P-5'-P-CCNC: 16 U/L (ref 0–45)
ANION GAP SERPL CALCULATED.3IONS-SCNC: 10 MMOL/L (ref 5–18)
AST SERPL W P-5'-P-CCNC: 14 U/L (ref 0–40)
BILIRUB SERPL-MCNC: 1.5 MG/DL (ref 0–1)
BUN SERPL-MCNC: 27 MG/DL (ref 8–22)
CALCIUM SERPL-MCNC: 9.4 MG/DL (ref 8.5–10.5)
CHLORIDE BLD-SCNC: 104 MMOL/L (ref 98–107)
CO2 SERPL-SCNC: 25 MMOL/L (ref 22–31)
CREAT SERPL-MCNC: 1.86 MG/DL (ref 0.7–1.3)
ERYTHROCYTE [DISTWIDTH] IN BLOOD BY AUTOMATED COUNT: 13.3 % (ref 10–15)
GFR SERPL CREATININE-BSD FRML MDRD: 38 ML/MIN/1.73M2
GLUCOSE BLD-MCNC: 107 MG/DL (ref 70–125)
HCT VFR BLD AUTO: 40.5 % (ref 40–53)
HGB BLD-MCNC: 14 G/DL (ref 13.3–17.7)
MCH RBC QN AUTO: 29.5 PG (ref 26.5–33)
MCHC RBC AUTO-ENTMCNC: 34.6 G/DL (ref 31.5–36.5)
MCV RBC AUTO: 85 FL (ref 78–100)
PLATELET # BLD AUTO: 153 10E3/UL (ref 150–450)
POTASSIUM BLD-SCNC: 4.5 MMOL/L (ref 3.5–5)
PROT SERPL-MCNC: 7 G/DL (ref 6–8)
RBC # BLD AUTO: 4.74 10E6/UL (ref 4.4–5.9)
SODIUM SERPL-SCNC: 139 MMOL/L (ref 136–145)
TSH SERPL DL<=0.005 MIU/L-ACNC: 2.12 UIU/ML (ref 0.3–5)
WBC # BLD AUTO: 5.3 10E3/UL (ref 4–11)

## 2021-07-30 PROCEDURE — 93010 ELECTROCARDIOGRAM REPORT: CPT | Performed by: INTERNAL MEDICINE

## 2021-07-30 PROCEDURE — 99214 OFFICE O/P EST MOD 30 MIN: CPT | Mod: 25 | Performed by: FAMILY MEDICINE

## 2021-07-30 PROCEDURE — 83880 ASSAY OF NATRIURETIC PEPTIDE: CPT | Performed by: FAMILY MEDICINE

## 2021-07-30 PROCEDURE — 85027 COMPLETE CBC AUTOMATED: CPT | Performed by: FAMILY MEDICINE

## 2021-07-30 PROCEDURE — 80053 COMPREHEN METABOLIC PANEL: CPT | Performed by: FAMILY MEDICINE

## 2021-07-30 PROCEDURE — 71046 X-RAY EXAM CHEST 2 VIEWS: CPT | Mod: TC | Performed by: RADIOLOGY

## 2021-07-30 PROCEDURE — 36415 COLL VENOUS BLD VENIPUNCTURE: CPT | Performed by: FAMILY MEDICINE

## 2021-07-30 PROCEDURE — 93005 ELECTROCARDIOGRAM TRACING: CPT | Performed by: FAMILY MEDICINE

## 2021-07-30 PROCEDURE — 99396 PREV VISIT EST AGE 40-64: CPT | Performed by: FAMILY MEDICINE

## 2021-07-30 PROCEDURE — 84443 ASSAY THYROID STIM HORMONE: CPT | Performed by: FAMILY MEDICINE

## 2021-07-30 RX ORDER — FUROSEMIDE 20 MG
20 TABLET ORAL DAILY
Qty: 30 TABLET | Refills: 3 | Status: SHIPPED | OUTPATIENT
Start: 2021-07-30 | End: 2021-08-20

## 2021-07-30 ASSESSMENT — MIFFLIN-ST. JEOR: SCORE: 2069.99

## 2021-07-30 NOTE — PATIENT INSTRUCTIONS
Stop taking amlodipine for now - it may be contributing to swelling    Start furosemide 20 mg once daily - this is a water pill. This will make you urinate after yo take the pill.  Don't take too close to bedtime or it will keep you up.    Cut out salt from your diet.

## 2021-07-30 NOTE — PROGRESS NOTES
SUBJECTIVE:   CC: Irving Baca is an 60 year old male who presents for preventative health visit.     He suffered an acute hemic stroke on August 10, 2020.  During the course of his hospital and rehabilitation stay he developed a subdural hematoma while on aspirin and Plavix.  He also was found to have a left lower extremity DVT.  Symptoms of stroke included right-sided arm and leg sensory disturbance as well as tingling on the left side of the face.  There are some question about whether speech and cognition were affected.    Subsequent follow-up with neurosurgery showed that the subdural hematoma resolved.  He had a follow-up ultrasound that showed no further presence of a DVT and he is not anticoagulated at this point in time.  He had incidental findings of aortic regurgitation and dilated ascending aorta.        He has returned to work, driving and most normal activities.  He is here today for physical but reports concerns with bilateral lower extremity edema, dyspnea on exertion.  He has no history of heart failure or any cardiac concerns.  He reports that his brother has had similar symptoms and underwent an extensive work-up and was ultimately was treated with diuretic.  He has had significant notable weight gain.  Blood pressure is noted to be reasonable.  Continues on current medications.  No signs or symptoms of DVT.  Reports no other significant difficulties.    Reviewed routine health preventive measures today.          Patient has been advised of split billing requirements and indicates understanding: Yes      Today's PHQ-2 Score:   PHQ-2 ( 1999 [employer]) 7/30/2021   Q1: Little interest or pleasure in doing things 0   Q2: Feeling down, depressed or hopeless 0   PHQ-2 Score 0   Q1: Little interest or pleasure in doing things Not at all   Q2: Feeling down, depressed or hopeless Not at all   PHQ-2 Score 0       Abuse: Current or Past(Physical, Sexual or Emotional)- No  Do you feel safe in your  environment? Yes    Have you ever done Advance Care Planning? (For example, a Health Directive, POLST, or a discussion with a medical provider or your loved ones about your wishes):     Social History     Tobacco Use     Smoking status: Never Smoker     Smokeless tobacco: Never Used   Substance Use Topics     Alcohol use: Yes     If you drink alcohol do you typically have >3 drinks per day or >7 drinks per week? No        Last PSA:   Prostate Specific Antigen Screen   Date Value Ref Range Status   04/10/2018 2.6 0.00 - 3.50 ng/mL Final       Reviewed orders with patient. Reviewed health maintenance and updated orders accordingly - Yes      Reviewed and updated as needed this visit by clinical staff    Reviewed and updated as needed this visit by Provider        Review of Systems  Complete review of systems is obtained.  Other than the specific considerations noted above complete review of systems is negative.      OBJECTIVE:   Answers for HPI/ROS submitted by the patient on 7/30/2021  Frequency of exercise:: 4-5 days/week  Getting at least 3 servings of Calcium per day:: Yes  Diet:: Low salt  Taking medications regularly:: No  Medication side effects:: Other  Bi-annual eye exam:: NO  Dental care twice a year:: Yes  Sleep apnea or symptoms of sleep apnea:: None  Additional concerns today:: Yes  Duration of exercise:: 15-30 minutes  Barriers to taking medications:: None    Wt Readings from Last 3 Encounters:   07/30/21 124.3 kg (274 lb)   10/12/20 114.3 kg (252 lb)   09/23/20 110.2 kg (243 lb)         Physical Exam      General Appearance:    Alert, cooperative, no distress   Eyes:   No scleral icterus or conjunctival irritation       Ears:    Normal TM's and external ear canals, both ears   Throat:   Lips, mucosa, and tongue normal; teeth and gums normal   Neck:   Supple, symmetrical, trachea midline, no adenopathy;        thyroid:  No enlargement/tenderness/nodules   Lungs:     Clear to auscultation bilaterally,  respirations unlabored, no wheezes or crackles   Heart:    Regular rate and rhythm,  No murmur   Abdomen:    Soft, no distention, no tenderness on palpation, no masses, no organomegaly     Extremities:  Significant pitting bilateral lower extremity edema up to the level just above the knee.  It is +2-3 and pitting.  No redness no significant pain on palpation.   Skin:  No concerning skin findings, no suspicious moles, no rashes   Neurologic:  On gross examination there is no motor or sensory deficit.  Patient walks with a normal gait                 ASSESSMENT/PLAN:   Irving was seen today for physical, edema and weight check.    Diagnoses and all orders for this visit:    Routine history and physical examination of adult    DALY (dyspnea on exertion)  -     Comprehensive metabolic panel (BMP + Alb, Alk Phos, ALT, AST, Total. Bili, TP)  -     CBC with platelets; Future  -     B-Type Natriuretic Peptide ( [facility] Only); Future  -     EKG 12-lead, tracing only  -     XR Chest 2 Views; Future  -     CBC with platelets  -     B-Type Natriuretic Peptide ([facility] Only)  -     Echocardiogram Complete; Future    Morbid obesity (H)    Edema, unspecified type  -     Comprehensive metabolic panel (BMP + Alb, Alk Phos, ALT, AST, Total. Bili, TP)  -     B-Type Natriuretic Peptide ( [facility] Only); Future  -     B-Type Natriuretic Peptide ( [facility] Only)  -     furosemide (LASIX) 20 MG tablet; Take 1 tablet (20 mg) by mouth daily  -     Echocardiogram Complete; Future    Weight gain  -     TSH; Future  -     TSH  -     furosemide (LASIX) 20 MG tablet; Take 1 tablet (20 mg) by mouth daily  -     Echocardiogram Complete; Future    Acute ischemic stroke (H)    SDH (subdural hematoma) (H)    Personal history of DVT (deep vein thrombosis)    Essential hypertension    Hyperlipidemia, unspecified hyperlipidemia type    Moderate aortic regurgitation    Mild ascending aorta dilatation (H)       Chest x-ray does not show any sign of fluid or  other significant problems.  EKG is normal with sinus bradycardia.  Hemogram is normal.  Unclear cause for lower extremity edema multiple possibilities exist.  We will place him on frusemide 20 mg daily.  Obtain additional labs.  Schedule echocardiogram.  Have him follow-up with me in 1 week.  At that time we will reassess the situation.  If you develop shortness of breath chest pain or any other concerning symptoms which are discussed and outlined extensively he is still to the emergency department for evaluation.  He has not any distress at this point in time.  He is educated on the importance of a low-salt diet, elevating his feet and other measures to control edema regardless of the cause.  Stop taking amlodipine for now.  Monitor blood pressure.    Recent Results (from the past 240 hour(s))   Comprehensive metabolic panel (BMP + Alb, Alk Phos, ALT, AST, Total. Bili, TP)    Collection Time: 07/30/21  4:20 PM   Result Value Ref Range    Sodium 139 136 - 145 mmol/L    Potassium 4.5 3.5 - 5.0 mmol/L    Chloride 104 98 - 107 mmol/L    Carbon Dioxide (CO2) 25 22 - 31 mmol/L    Anion Gap 10 5 - 18 mmol/L    Urea Nitrogen 27 (H) 8 - 22 mg/dL    Creatinine 1.86 (H) 0.70 - 1.30 mg/dL    Calcium 9.4 8.5 - 10.5 mg/dL    Glucose 107 70 - 125 mg/dL    Alkaline Phosphatase 110 45 - 120 U/L    AST 14 0 - 40 U/L    ALT 16 0 - 45 U/L    Protein Total 7.0 6.0 - 8.0 g/dL    Albumin 4.0 3.5 - 5.0 g/dL    Bilirubin Total 1.5 (H) 0.0 - 1.0 mg/dL    GFR Estimate 38 (L) >60 mL/min/1.73m2   TSH    Collection Time: 07/30/21  4:20 PM   Result Value Ref Range    TSH 2.12 0.30 - 5.00 uIU/mL   CBC with platelets    Collection Time: 07/30/21  4:21 PM   Result Value Ref Range    WBC Count 5.3 4.0 - 11.0 10e3/uL    RBC Count 4.74 4.40 - 5.90 10e6/uL    Hemoglobin 14.0 13.3 - 17.7 g/dL    Hematocrit 40.5 40.0 - 53.0 %    MCV 85 78 - 100 fL    MCH 29.5 26.5 - 33.0 pg    MCHC 34.6 31.5 - 36.5 g/dL    RDW 13.3 10.0 - 15.0 %    Platelet Count 153  "150 - 450 10e3/uL   EKG 12-lead, tracing only    Collection Time: 07/30/21  4:37 PM   Result Value Ref Range    Systolic Blood Pressure  mmHg    Diastolic Blood Pressure  mmHg    Ventricular Rate 56 BPM    Atrial Rate 56 BPM    VA Interval 172 ms    QRS Duration 78 ms     ms    QTc 409 ms    P Axis 77 degrees    R AXIS 31 degrees    T Axis 60 degrees    Interpretation ECG       Sinus bradycardia  Otherwise normal ECG  When compared with ECG of 10-AUG-2020 14:42,  Vent. rate has decreased BY  33 BPM  Minimal criteria for Inferior infarct are no longer Present  Nonspecific T wave abnormality no longer evident in Anterior leads  QT has shortened  Confirmed by AMANDA HARRELL MD LOC:[facility] ([id number]) on 7/31/2021 3:52:39 PM         Patient has been advised of split billing requirements and indicates understanding:   COUNSELING:       Estimated body mass index is 38.22 kg/m  as calculated from the following:    Height as of this encounter: 1.803 m (5' 11\").    Weight as of this encounter: 124.3 kg (274 lb).         He reports that he has never smoked. He has never used smokeless tobacco.      Counseling Resources:  ATP IV Guidelines  Pooled Cohorts Equation Calculator  FRAX Risk Assessment  ICSI Preventive Guidelines  Dietary Guidelines for Americans, 2010  [employer] MyPlate  ASA Prophylaxis  Lung CA Screening    Chance Del Toro MD, MD  [facility]  "

## 2021-07-31 LAB
ATRIAL RATE - MUSE: 56 BPM
DIASTOLIC BLOOD PRESSURE - MUSE: NORMAL MMHG
INTERPRETATION ECG - MUSE: NORMAL
P AXIS - MUSE: 77 DEGREES
PR INTERVAL - MUSE: 172 MS
QRS DURATION - MUSE: 78 MS
QT - MUSE: 424 MS
QTC - MUSE: 409 MS
R AXIS - MUSE: 31 DEGREES
SYSTOLIC BLOOD PRESSURE - MUSE: NORMAL MMHG
T AXIS - MUSE: 60 DEGREES
VENTRICULAR RATE- MUSE: 56 BPM

## 2021-08-02 LAB — BNP SERPL-MCNC: 117 PG/ML (ref 0–53)

## 2021-08-10 ENCOUNTER — OFFICE VISIT (OUTPATIENT)
Dept: FAMILY MEDICINE | Facility: CLINIC | Age: 61
End: 2021-08-10
Payer: COMMERCIAL

## 2021-08-10 VITALS
DIASTOLIC BLOOD PRESSURE: 76 MMHG | BODY MASS INDEX: 37.39 KG/M2 | OXYGEN SATURATION: 97 % | HEART RATE: 56 BPM | SYSTOLIC BLOOD PRESSURE: 136 MMHG | WEIGHT: 268.1 LBS

## 2021-08-10 DIAGNOSIS — R60.9 EDEMA, UNSPECIFIED TYPE: ICD-10-CM

## 2021-08-10 DIAGNOSIS — R63.5 WEIGHT GAIN: ICD-10-CM

## 2021-08-10 DIAGNOSIS — R06.09 DOE (DYSPNEA ON EXERTION): Primary | ICD-10-CM

## 2021-08-10 PROCEDURE — 99213 OFFICE O/P EST LOW 20 MIN: CPT | Performed by: FAMILY MEDICINE

## 2021-08-10 NOTE — PROGRESS NOTES
Irving Baca  /[birth year]   Pulse 56   Wt 121.6 kg (268 lb 1.6 oz)   SpO2 97%   BMI 37.39 kg/m       Assessment/Plan:                Irving was seen today for recheck, edema, medication problem, labs only and medication problem.    Diagnoses and all orders for this visit:    DALY (dyspnea on exertion)    Edema, unspecified type    Weight gain         DISCUSSION  He has had some improvement with a few days of relatively low-dose diuretic.  Will increase the dose slightly.  No indication for labs today given relatively short-term use of the diuretic itself.  1 follow-up with me in a week, recheck labs at that time.  We will confer with his cardiologist regarding whether we should try and obtain an echocardiogram in a more timely fashion or proceed with EMR and as to whether he should be seen.  Overall seems to be improving.  Still relatively unclear cause for edema which could still be a more dependent edema with person with a high salt diet versus more significant cardiac concerns.  Subjective:     HPI:    Irivng Baca is a 60 year old male with history of stroke 1 year ago in August 2020 complicated by DVT and incidental finding of moderate aortic valve regurgitation, dilated aortic root.    Doing relatively well over the course the past year, had returned to work and relatively normal functioning.  He came to see me for a physical 1 week ago and has significant edema and weight gain.  He was also complaining of shortness of breath with activity.  An EKG and chest x-ray did not show significant abnormality.  BNP was elevated at 117 but no other significant lab test abnormalities.    His amlodipine was discontinued and he was started on furosemide.  He wanted to see if the amlodipine was the culprit so he waited a few days before starting the furosemide.  He did not note significant improvement in the edema after just simply discontinuing the amlodipine.  With 3 days of diuretic therapy his weight is down and  he notices less overall swelling.  He does generally report that swelling is less in the morning when he wakes up.  His weight is down about 6 pounds but still up overall.  He is working to try and cut out and cut back on salt.    He was to follow-up with cardiology in approximately September with an MR to evaluate his aortic root, aortic valve and overall cardiac function.    ROS:  Complete review of systems is obtained.  Other than the specific considerations noted above complete review of systems is negative.          Objective:   Medications:  Current Outpatient Medications   Medication     acetaminophen (TYLENOL) 325 MG tablet     aspirin 81 MG EC tablet     atorvastatin (LIPITOR) 80 MG tablet     carvediloL (COREG) 25 MG tablet     furosemide (LASIX) 20 MG tablet     No current facility-administered medications for this visit.        Allergies:     Allergies   Allergen Reactions     Lisinopril Cough        Social History     Socioeconomic History     Marital status: Single     Spouse name: Not on file     Number of children: Not on file     Years of education: Not on file     Highest education level: Not on file   Occupational History     Not on file   Tobacco Use     Smoking status: Never Smoker     Smokeless tobacco: Never Used   Substance and Sexual Activity     Alcohol use: Yes     Drug use: Never     Sexual activity: Not on file   Other Topics Concern     Not on file   Social History Narrative     Not on file     Social Determinants of Health     Financial Resource Strain:      Difficulty of Paying Living Expenses:    Food Insecurity:      Worried About Running Out of Food in the Last Year:      Ran Out of Food in the Last Year:    Transportation Needs:      Lack of Transportation (Medical):      Lack of Transportation (Non-Medical):    Physical Activity:      Days of Exercise per Week:      Minutes of Exercise per Session:    Stress:      Feeling of Stress :    Social Connections:      Frequency of  Communication with Friends and Family:      Frequency of Social Gatherings with Friends and Family:      Attends Confucianism Services:      Active Member of Clubs or Organizations:      Attends Club or Organization Meetings:      Marital Status:    Intimate Partner Violence:      Fear of Current or Ex-Partner:      Emotionally Abused:      Physically Abused:      Sexually Abused:        No family history on file.     Most Recent Immunizations   Administered Date(s) Administered     DT (PEDS <7y) 10/08/2002     Td (Adult), Adsorbed 10/08/2002     Td,adult,historic,unspecified 10/08/2002     Tdap (Adacel,Boostrix) 04/30/2015        Wt Readings from Last 3 Encounters:   08/10/21 121.6 kg (268 lb 1.6 oz)   07/30/21 124.3 kg (274 lb)   10/12/20 114.3 kg (252 lb)        BP Readings from Last 6 Encounters:   08/10/21 136/76   07/30/21 132/68   10/12/20 130/72   09/23/20 (!) 142/72   09/11/20 (!) 144/76        No results found for: A1C, HEMOGLOBINA1           PHYSICAL EXAM:    /76   Pulse 56   Wt 121.6 kg (268 lb 1.6 oz)   SpO2 97%   BMI 37.39 kg/m       General: Patient no signs of distress    Lungs: Clear without wheeze or crackle    Heart: Regular rate and rhythm no murmur    Extremities: Warm edema improved from previous approximately +2 and pitting.  Up to about the mid shin to slightly above.  No evidence of skin breakdown.  No redness is seen.

## 2021-08-11 ENCOUNTER — TELEPHONE (OUTPATIENT)
Dept: CARDIOLOGY | Facility: CLINIC | Age: 61
End: 2021-08-11

## 2021-08-11 NOTE — TELEPHONE ENCOUNTER
----- Message from Marleen Ryan MD sent at 8/10/2021  5:34 PM CDT -----  Regarding: FW: Follow up withQuestion  Sergio,    Could you order cardiac MRA for this patient and schedule to see me post cardiac MRA study.    Thanks  Judd    ----- Message -----  From: Chance Del Toro MD  Sent: 8/10/2021   5:00 PM CDT  To: Marleen Ryan MD  Subject: Follow up withQuestion                           You saw this patient 1 year ago after he was found to have dilated aortic root and aortic regurgitation during a hospitalization for a stroke.  You had recommended follow-up in September 2021 with a cardiac MRI.    Recently he has developed significant lower extremity edema, mild dyspnea on exertion accompanied by significant weight gain.  Has started to respond to diuretic.  BNP level was 117.    Would you recommend proceeding with an echocardiogram, scheduling the MRI you had previously planned and/or would you like to see him at this point in time?    Thanks,    Julio Del Toro

## 2021-08-12 ENCOUNTER — TELEPHONE (OUTPATIENT)
Dept: FAMILY MEDICINE | Facility: CLINIC | Age: 61
End: 2021-08-12

## 2021-08-12 NOTE — TELEPHONE ENCOUNTER
----- Message from Chance Del Toro MD sent at 8/11/2021  4:22 PM CDT -----  Regarding: FW: Follow up withQuestion  Please call patient: Inform him that I have spoken with Dr. Ryan who has recommended we obtain the MRI scan of the heart and Dr. Ryan will be making arrangements to see him as soon as that is completed.  ----- Message -----  From: Marleen Ryna MD  Sent: 8/10/2021   5:32 PM CDT  To: Chance Del Toro MD  Subject: RE: Follow up withQuestion                       Dr. Del Toro,  Please have cardiac MRA study as soon as possible to see if he has aortic valve regurgitation caused congestive heart failure.  Gentle diuresis is indicated.  I can see him after cardiac MRA done.  I will ask my nurse to schedule him to see me asap.  Thanks  Judd    ----- Message -----  From: Chance Del Toro MD  Sent: 8/10/2021   5:00 PM CDT  To: Marleen Ryan MD  Subject: Follow up withQuestion                           You saw this patient 1 year ago after he was found to have dilated aortic root and aortic regurgitation during a hospitalization for a stroke.  You had recommended follow-up in September 2021 with a cardiac MRI.    Recently he has developed significant lower extremity edema, mild dyspnea on exertion accompanied by significant weight gain.  Has started to respond to diuretic.  BNP level was 117.    Would you recommend proceeding with an echocardiogram, scheduling the MRI you had previously planned and/or would you like to see him at this point in time?    Thanks,    Julio Del Toro

## 2021-08-20 ENCOUNTER — OFFICE VISIT (OUTPATIENT)
Dept: FAMILY MEDICINE | Facility: CLINIC | Age: 61
End: 2021-08-20
Payer: COMMERCIAL

## 2021-08-20 VITALS
BODY MASS INDEX: 37.13 KG/M2 | HEART RATE: 61 BPM | SYSTOLIC BLOOD PRESSURE: 144 MMHG | WEIGHT: 266.2 LBS | OXYGEN SATURATION: 97 % | DIASTOLIC BLOOD PRESSURE: 76 MMHG

## 2021-08-20 DIAGNOSIS — I77.810 MILD ASCENDING AORTA DILATATION (H): ICD-10-CM

## 2021-08-20 DIAGNOSIS — I10 ESSENTIAL HYPERTENSION: ICD-10-CM

## 2021-08-20 DIAGNOSIS — R60.9 EDEMA, UNSPECIFIED TYPE: Primary | ICD-10-CM

## 2021-08-20 DIAGNOSIS — R06.09 DOE (DYSPNEA ON EXERTION): ICD-10-CM

## 2021-08-20 DIAGNOSIS — E78.5 DYSLIPIDEMIA: ICD-10-CM

## 2021-08-20 DIAGNOSIS — N17.9 AKI (ACUTE KIDNEY INJURY) (H): ICD-10-CM

## 2021-08-20 DIAGNOSIS — I35.1 MODERATE AORTIC REGURGITATION: ICD-10-CM

## 2021-08-20 DIAGNOSIS — R63.5 WEIGHT GAIN: ICD-10-CM

## 2021-08-20 LAB
ANION GAP SERPL CALCULATED.3IONS-SCNC: 12 MMOL/L (ref 5–18)
BNP SERPL-MCNC: 59 PG/ML (ref 0–53)
BUN SERPL-MCNC: 26 MG/DL (ref 8–22)
CALCIUM SERPL-MCNC: 9.6 MG/DL (ref 8.5–10.5)
CHLORIDE BLD-SCNC: 102 MMOL/L (ref 98–107)
CHOLEST SERPL-MCNC: 114 MG/DL
CO2 SERPL-SCNC: 24 MMOL/L (ref 22–31)
CREAT SERPL-MCNC: 2.03 MG/DL (ref 0.7–1.3)
ERYTHROCYTE [DISTWIDTH] IN BLOOD BY AUTOMATED COUNT: 13.5 % (ref 10–15)
FASTING STATUS PATIENT QL REPORTED: NO
GFR SERPL CREATININE-BSD FRML MDRD: 34 ML/MIN/1.73M2
GLUCOSE BLD-MCNC: 97 MG/DL (ref 70–125)
HCT VFR BLD AUTO: 41.9 % (ref 40–53)
HDLC SERPL-MCNC: 33 MG/DL
HGB BLD-MCNC: 14.3 G/DL (ref 13.3–17.7)
LDLC SERPL CALC-MCNC: 48 MG/DL
MCH RBC QN AUTO: 29.5 PG (ref 26.5–33)
MCHC RBC AUTO-ENTMCNC: 34.1 G/DL (ref 31.5–36.5)
MCV RBC AUTO: 86 FL (ref 78–100)
PLATELET # BLD AUTO: 150 10E3/UL (ref 150–450)
POTASSIUM BLD-SCNC: 4.2 MMOL/L (ref 3.5–5)
RBC # BLD AUTO: 4.85 10E6/UL (ref 4.4–5.9)
SODIUM SERPL-SCNC: 138 MMOL/L (ref 136–145)
TRIGL SERPL-MCNC: 167 MG/DL
WBC # BLD AUTO: 6 10E3/UL (ref 4–11)

## 2021-08-20 PROCEDURE — 83880 ASSAY OF NATRIURETIC PEPTIDE: CPT | Performed by: FAMILY MEDICINE

## 2021-08-20 PROCEDURE — 80061 LIPID PANEL: CPT | Performed by: FAMILY MEDICINE

## 2021-08-20 PROCEDURE — 36415 COLL VENOUS BLD VENIPUNCTURE: CPT | Performed by: FAMILY MEDICINE

## 2021-08-20 PROCEDURE — 85027 COMPLETE CBC AUTOMATED: CPT | Performed by: FAMILY MEDICINE

## 2021-08-20 PROCEDURE — 99214 OFFICE O/P EST MOD 30 MIN: CPT | Performed by: FAMILY MEDICINE

## 2021-08-20 PROCEDURE — 80048 BASIC METABOLIC PNL TOTAL CA: CPT | Performed by: FAMILY MEDICINE

## 2021-08-20 RX ORDER — CARVEDILOL 25 MG/1
25 TABLET ORAL 2 TIMES DAILY
Qty: 60 TABLET | Refills: 11 | Status: CANCELLED | OUTPATIENT
Start: 2021-08-20

## 2021-08-20 RX ORDER — FUROSEMIDE 20 MG
20 TABLET ORAL 2 TIMES DAILY
Qty: 60 TABLET | Refills: 1 | Status: SHIPPED | OUTPATIENT
Start: 2021-08-20 | End: 2021-12-29

## 2021-08-20 RX ORDER — FUROSEMIDE 20 MG
20 TABLET ORAL 2 TIMES DAILY
Qty: 60 TABLET | Refills: 11 | Status: CANCELLED | OUTPATIENT
Start: 2021-08-20

## 2021-08-20 NOTE — PROGRESS NOTES
Irving Baca  BP (!) 144/76   Pulse 61   Wt 120.7 kg (266 lb 3.2 oz)   SpO2 97%   BMI 37.13 kg/m       Assessment/Plan:                Irving was seen today for recheck medication.    Diagnoses and all orders for this visit:    Edema, unspecified type  -     Basic metabolic panel; Future  -     B-Type Natriuretic Peptide ([facility] Only); Future  -     Basic metabolic panel  -     B-Type Natriuretic Peptide ([facility] Only)    Essential hypertension    Weight gain  -     Basic metabolic panel; Future  -     B-Type Natriuretic Peptide ([facility] Only); Future  -     Basic metabolic panel  -     B-Type Natriuretic Peptide ([facility] Only)    Mild ascending aorta dilatation (H)    Moderate aortic regurgitation    DALY (dyspnea on exertion)  -     CBC with platelets; Future  -     CBC with platelets    Dyslipidemia  -     Lipid panel reflex to direct LDL Fasting; Future  -     Lipid panel reflex to direct LDL Fasting    DAVY (acute kidney injury) (H)         DISCUSSION  Obtain lab tests as noted above.  Follow-up with cardiology as planned.  Adjust treatment according to test results.  Plan short-term follow-up to ensure that he continues to have adequate diuresis.  Subjective:     HPI:    Irving Baca is a 60 year old male is here today for follow-up.  It presented on July 30, 2021 with symptoms of significant weight gain and lower extremity edema with complaints of shortness of breath.  In addition to history of ischemic stroke, subdural hematoma and DVT he was noted to have aortic regurgitation as well as a sending aortic dilatation.  Laboratory tests were obtained and he was started on furosemide.  He was seen in follow-up on 10 August and improvement was noted.  He will be seeing cardiology as well as having a cardiac MRI.  He is due for lab testing.  He reports further improvement today.  Still dealing with lower extremity edema that is reported as significant.  Denies worsening shortness of breath denies any new  symptoms.  Remains off of amlodipine.  Blood pressure noted to be a bit higher than ideal.    ROS:  Complete review of systems is obtained.  Other than the specific considerations noted above complete review of systems is negative.          Objective:   Medications:  Current Outpatient Medications   Medication     acetaminophen (TYLENOL) 325 MG tablet     aspirin 81 MG EC tablet     atorvastatin (LIPITOR) 80 MG tablet     carvediloL (COREG) 25 MG tablet     furosemide (LASIX) 20 MG tablet     No current facility-administered medications for this visit.        Allergies:     Allergies   Allergen Reactions     Lisinopril Cough        Social History     Socioeconomic History     Marital status: Single     Spouse name: Not on file     Number of children: Not on file     Years of education: Not on file     Highest education level: Not on file   Occupational History     Not on file   Tobacco Use     Smoking status: Never Smoker     Smokeless tobacco: Never Used   Substance and Sexual Activity     Alcohol use: Yes     Drug use: Never     Sexual activity: Not on file   Other Topics Concern     Not on file   Social History Narrative     Not on file     Social Determinants of Health     Financial Resource Strain:      Difficulty of Paying Living Expenses:    Food Insecurity:      Worried About Running Out of Food in the Last Year:      Ran Out of Food in the Last Year:    Transportation Needs:      Lack of Transportation (Medical):      Lack of Transportation (Non-Medical):    Physical Activity:      Days of Exercise per Week:      Minutes of Exercise per Session:    Stress:      Feeling of Stress :    Social Connections:      Frequency of Communication with Friends and Family:      Frequency of Social Gatherings with Friends and Family:      Attends Judaism Services:      Active Member of Clubs or Organizations:      Attends Club or Organization Meetings:      Marital Status:    Intimate Partner Violence:      Fear of  Current or Ex-Partner:      Emotionally Abused:      Physically Abused:      Sexually Abused:        No family history on file.     Most Recent Immunizations   Administered Date(s) Administered     DT (PEDS <7y) 10/08/2002     Td (Adult), Adsorbed 10/08/2002     Td,adult,historic,unspecified 10/08/2002     Tdap (Adacel,Boostrix) 04/30/2015        Wt Readings from Last 3 Encounters:   08/20/21 120.7 kg (266 lb 3.2 oz)   08/10/21 121.6 kg (268 lb 1.6 oz)   07/30/21 124.3 kg (274 lb)        BP Readings from Last 6 Encounters:   08/20/21 (!) 144/76   08/10/21 136/76   07/30/21 132/68   10/12/20 130/72   09/23/20 (!) 142/72   09/11/20 (!) 144/76        No results found for: A1C, HEMOGLOBINA1           PHYSICAL EXAM:    BP (!) 144/76   Pulse 61   Wt 120.7 kg (266 lb 3.2 oz)   SpO2 97%   BMI 37.13 kg/m       General: Patient alert no signs of distress    Lungs: Good air movement throughout no wheeze crackle or other focal sound    Heart: Regular rate and rhythm no murmurs heard.    Extremities: Warm, there is edema at approximately +2 and pitting to the midshin, slightly improved from prior visit but still present.  No evidence of skin breakdown.  No redness or evidence of cellulitis.  No significant pain on palpation.

## 2021-09-03 ENCOUNTER — HOSPITAL ENCOUNTER (OUTPATIENT)
Dept: MRI IMAGING | Facility: HOSPITAL | Age: 61
End: 2021-09-03
Attending: INTERNAL MEDICINE
Payer: COMMERCIAL

## 2021-09-03 DIAGNOSIS — I77.810 ASCENDING AORTA DILATION (H): ICD-10-CM

## 2021-09-03 DIAGNOSIS — I35.1 NONRHEUMATIC AORTIC VALVE INSUFFICIENCY: ICD-10-CM

## 2021-09-03 PROCEDURE — A9585 GADOBUTROL INJECTION: HCPCS | Performed by: INTERNAL MEDICINE

## 2021-09-03 PROCEDURE — 999N000122 MR MYOCARDIUM  OVERREAD

## 2021-09-03 PROCEDURE — 71555 MRI ANGIO CHEST W OR W/O DYE: CPT | Mod: 26 | Performed by: INTERNAL MEDICINE

## 2021-09-03 PROCEDURE — 255N000002 HC RX 255 OP 636: Performed by: INTERNAL MEDICINE

## 2021-09-03 PROCEDURE — 71555 MRI ANGIO CHEST W OR W/O DYE: CPT

## 2021-09-03 RX ORDER — GADOBUTROL 604.72 MG/ML
20 INJECTION INTRAVENOUS ONCE
Status: COMPLETED | OUTPATIENT
Start: 2021-09-03 | End: 2021-09-03

## 2021-09-03 RX ADMIN — GADOBUTROL 20 ML: 604.72 INJECTION INTRAVENOUS at 11:02

## 2021-09-23 ENCOUNTER — OFFICE VISIT (OUTPATIENT)
Dept: CARDIOLOGY | Facility: CLINIC | Age: 61
End: 2021-09-23
Payer: COMMERCIAL

## 2021-09-23 VITALS
DIASTOLIC BLOOD PRESSURE: 90 MMHG | HEART RATE: 59 BPM | WEIGHT: 258.7 LBS | RESPIRATION RATE: 16 BRPM | HEIGHT: 71 IN | SYSTOLIC BLOOD PRESSURE: 140 MMHG | BODY MASS INDEX: 36.22 KG/M2

## 2021-09-23 DIAGNOSIS — E78.5 DYSLIPIDEMIA, GOAL LDL BELOW 100: ICD-10-CM

## 2021-09-23 DIAGNOSIS — N18.31 STAGE 3A CHRONIC KIDNEY DISEASE (H): ICD-10-CM

## 2021-09-23 DIAGNOSIS — E66.01 MORBID OBESITY (H): ICD-10-CM

## 2021-09-23 DIAGNOSIS — I35.1 NONRHEUMATIC AORTIC VALVE INSUFFICIENCY: Primary | ICD-10-CM

## 2021-09-23 DIAGNOSIS — I10 ESSENTIAL HYPERTENSION: ICD-10-CM

## 2021-09-23 PROCEDURE — 99214 OFFICE O/P EST MOD 30 MIN: CPT | Performed by: INTERNAL MEDICINE

## 2021-09-23 ASSESSMENT — MIFFLIN-ST. JEOR: SCORE: 1995.59

## 2021-09-23 NOTE — PROGRESS NOTES
Assessment/Plan:   1.  Aortic valve regurgitation: The patient had cardiac MRA which is reported mild aortic valve regurgitation.  Normal left and the right ventricular systolic function.  No aortic root enlargement.       2.  Essential hypertension: Her blood pressure is mildly high.    Continue carvedilol 25 twice a day, Lasix 20 mg daily.  He could not tolerate to amlodipine due to side effect of her dizziness.  He is going to check his blood pressure daily in next 10 days and then call me back.  We will adjust his medications as indicated.  The patient is in low-salt diet, doing lifestyle modification.     3.  Dyslipidemia: Continue Lipitor 80 mg at bedtime.  LDL is controlled.    4.  Stage III CKD: Follow-up with Dr. Del Toro.     5.  Ischemic stroke secondary to high-grade narrowing of intracranial vertebral arteries: Follow-up with neurology clinic.    Thank you for the opportunity to be involved in the care of Irving Baca. If you have any questions, please feel free to contact me.  I will see the patient again in 12 months and as needed.    Much or all of the text in this note was generated through the use of Dragon Dictate voice-to-text software. Errors in spelling or words which seem out of context are unintentional. Sound alike errors, in particular, may have escaped editing.       History of Present Illness:   It is my pleasure to see Irving Baca at the [facility] clinic for routine cardiology follow up.  Irving Baca is a 61 year old male with a medical history of aortic valve regurgitation, essential hypertension, dyslipidemia, history of DVT of left lower extremity, ischemic stroke.    The patient states that he has been doing quite well since the last visit.  He denies any chest pain, shortness of breath, palpitations, dizziness, orthopnea, PND.  His leg edema has been well controlled with 20 mg of Lasix daily.  His blood pressure is mildly high today.    He had  cardiac MRA on September 1 which was reported mild aortic valve regurgitation, normal left and the right ventricular systolic function, normal size of thoracic aorta.    Past Medical History:     Patient Active Problem List   Diagnosis     Dyslipidemia, goal LDL below 100     Prehypertension     ED (erectile dysfunction)     Dysphagia     Acute ischemic stroke (H)     SDH (subdural hematoma) (H)     Pain of left calf     Moderate aortic regurgitation     Ascending aorta dilation (H)     Impaired gait and mobility     Deep vein thrombosis (DVT) of distal vein of left lower extremity (H)     Essential hypertension     Nonrheumatic aortic valve insufficiency     Morbid obesity (H)       Past Surgical History:   No past surgical history on file.    Family History:   No family history on file.     Social History:    reports that he has never smoked. He has never used smokeless tobacco. He reports current alcohol use. He reports that he does not use drugs.    Review of Systems:   12 systems are reviewed negative except for in HPI.    Meds:     Current Outpatient Medications:      aspirin 81 MG EC tablet, [ASPIRIN 81 MG EC TABLET] Take 81 mg by mouth see administration instructions. Two to three times per week at bedtime., Disp: , Rfl:      atorvastatin (LIPITOR) 80 MG tablet, [ATORVASTATIN (LIPITOR) 80 MG TABLET] Take 1 tablet (80 mg total) by mouth at bedtime., Disp: 90 tablet, Rfl: 3     carvediloL (COREG) 25 MG tablet, [CARVEDILOL (COREG) 25 MG TABLET] Take 1 tablet (25 mg total) by mouth 2 (two) times a day., Disp: 60 tablet, Rfl: 11     furosemide (LASIX) 20 MG tablet, Take 1 tablet (20 mg) by mouth 2 times daily (Patient taking differently: Take 20 mg by mouth daily ), Disp: 60 tablet, Rfl: 1     acetaminophen (TYLENOL) 325 MG tablet, Take 325-650 mg by mouth  (Patient not taking: Reported on 9/23/2021), Disp: , Rfl:     Allergies:   Lisinopril      Objective:      Physical Exam  117.3 kg (258 lb 11.2 oz)  1.803 m  "(5' 11\")  Body mass index is 36.08 kg/m .  BP (!) 140/90 (BP Location: Right arm, Patient Position: Sitting, Cuff Size: Adult Large)   Pulse 59   Resp 16   Ht 1.803 m (5' 11\")   Wt 117.3 kg (258 lb 11.2 oz)   BMI 36.08 kg/m      General Appearance:   Awake, Alert, No acute distress.   HEENT:  Pupil equal and reactive to light. No scleral icterus; the mucous membranes were moist.   Neck: No cervical bruits. No JVD. No thyromegaly.     Chest: The spine was straight. The chest was symmetric.   Lungs:   Respirations unlabored; Lungs are clear to auscultation. No crackles. No wheezing.   Cardiovascular:   Regular rhythm and rate, normal first and second heart sounds with no murmurs. No rubs or gallops.    Abdomen:  Obese. Soft. No tenderness. Non-distended. Bowels sounds are present   Extremities: Equal tibial pulses. Trace leg edema.   Skin: No rashes or ulcers. Warm, Dry.   Musculoskeletal: No tenderness. No deformity.   Neurologic: Mood and affect are appropriate. No focal deficits.         EKG:  Personally reivewed  Normal sinus rhythm   Cannot rule out Inferior infarct , age undetermined   Abnormal ECG   No previous ECGs available     Cardiac Imaging Studies  ECHO  On 8-:    Left Ventricle: Normal left ventricular size and systolic function.The estimated left ventricular ejection fraction is 55%. This represents a normal ejection fraction. Mild concentric hypertrophy noted. E/e' 8 to 15, which is equivocal for estimating LV filling pressures.Left ventricular diastolic function is normal.    The left ventricular wall motion is normal.    Right Ventricle: Normal right ventricular size and systolic function. TAPSE is normal, which is consistent with normal right ventricular systolic function.    Aortic Valve: The valve is tricuspid. No aortic stenosis. Moderate aortic regurgitation with an eccentrically directed jet and toward mitral valve.    Thoracic Aorta: The ascending aorta is mildly dilated.    No " previous study for comparison.    Cardiac MRA on 9-3-2021:  1.  Normal left ventricular size, wall thickness and systolic function. The quantified left ventricular  ejection fraction is 58%.  No myocardial scar is identified.    2.  Normal right ventricular size and systolic function.    3.  Tricuspid aortic valve with mild aortic regurgitation. Aortic regurgitant volume 27.8 ml. Aortic  regurgitant fraction 21.6 %.  4. No significant enlargement of thoracic aorta.     Lab Review   Lab Results   Component Value Date     08/20/2021    CO2 24 08/20/2021    BUN 26 08/20/2021     Lab Results   Component Value Date    WBC 6.0 08/20/2021    HGB 14.3 08/20/2021    HCT 41.9 08/20/2021    MCV 86 08/20/2021     08/20/2021     Lab Results   Component Value Date    CHOL 114 08/20/2021    CHOL 173 08/11/2020    CHOL 163 04/10/2018     Lab Results   Component Value Date    HDL 33 (L) 08/20/2021    HDL 34 (L) 08/11/2020    HDL 29 (L) 04/10/2018     No components found for: LDLCALC  Lab Results   Component Value Date    TRIG 167 (H) 08/20/2021    TRIG 126 08/11/2020    TRIG 131 04/10/2018     No components found for: CHOLHDL  Lab Results   Component Value Date    TROPONINI <0.01 08/10/2020     Lab Results   Component Value Date    BNP 59 08/20/2021     Lab Results   Component Value Date    TSH 2.12 07/30/2021

## 2021-09-23 NOTE — LETTER
9/23/2021    Chance Del Toro MD, MD  [address]  [facility] [address]    RE: Irving Baca       Dear Colleague,    I had the pleasure of seeing Irving Baca in the [facility].              Assessment/Plan:   1.  Aortic valve regurgitation: The patient had cardiac MRA which is reported mild aortic valve regurgitation.  Normal left and the right ventricular systolic function.  No aortic root enlargement.       2.  Essential hypertension: Her blood pressure is mildly high.    Continue carvedilol 25 twice a day, Lasix 20 mg daily.  He could not tolerate to amlodipine due to side effect of her dizziness.  He is going to check his blood pressure daily in next 10 days and then call me back.  We will adjust his medications as indicated.  The patient is in low-salt diet, doing lifestyle modification.     3.  Dyslipidemia: Continue Lipitor 80 mg at bedtime.  LDL is controlled.    4.  Stage III CKD: Follow-up with Dr. Del Toro.     5.  Ischemic stroke secondary to high-grade narrowing of intracranial vertebral arteries: Follow-up with neurology clinic.    Thank you for the opportunity to be involved in the care of Irving Baca. If you have any questions, please feel free to contact me.  I will see the patient again in 12 months and as needed.    Much or all of the text in this note was generated through the use of Dragon Dictate voice-to-text software. Errors in spelling or words which seem out of context are unintentional. Sound alike errors, in particular, may have escaped editing.       History of Present Illness:   It is my pleasure to see Irving Baca at the [facility] clinic for routine cardiology follow up.  Irving Baca is a 61 year old male with a medical history of aortic valve regurgitation, essential hypertension, dyslipidemia, history of DVT of left lower extremity, ischemic stroke.    The patient states that he has  been doing quite well since the last visit.  He denies any chest pain, shortness of breath, palpitations, dizziness, orthopnea, PND.  His leg edema has been well controlled with 20 mg of Lasix daily.  His blood pressure is mildly high today.    He had cardiac MRA on September 1 which was reported mild aortic valve regurgitation, normal left and the right ventricular systolic function, normal size of thoracic aorta.    Past Medical History:     Patient Active Problem List   Diagnosis     Dyslipidemia, goal LDL below 100     Prehypertension     ED (erectile dysfunction)     Dysphagia     Acute ischemic stroke (H)     SDH (subdural hematoma) (H)     Pain of left calf     Moderate aortic regurgitation     Ascending aorta dilation (H)     Impaired gait and mobility     Deep vein thrombosis (DVT) of distal vein of left lower extremity (H)     Essential hypertension     Nonrheumatic aortic valve insufficiency     Morbid obesity (H)       Past Surgical History:   No past surgical history on file.    Family History:   No family history on file.     Social History:    reports that he has never smoked. He has never used smokeless tobacco. He reports current alcohol use. He reports that he does not use drugs.    Review of Systems:   12 systems are reviewed negative except for in HPI.    Meds:     Current Outpatient Medications:      aspirin 81 MG EC tablet, [ASPIRIN 81 MG EC TABLET] Take 81 mg by mouth see administration instructions. Two to three times per week at bedtime., Disp: , Rfl:      atorvastatin (LIPITOR) 80 MG tablet, [ATORVASTATIN (LIPITOR) 80 MG TABLET] Take 1 tablet (80 mg total) by mouth at bedtime., Disp: 90 tablet, Rfl: 3     carvediloL (COREG) 25 MG tablet, [CARVEDILOL (COREG) 25 MG TABLET] Take 1 tablet (25 mg total) by mouth 2 (two) times a day., Disp: 60 tablet, Rfl: 11     furosemide (LASIX) 20 MG tablet, Take 1 tablet (20 mg) by mouth 2 times daily (Patient taking differently: Take 20 mg by mouth daily ),  "Disp: 60 tablet, Rfl: 1     acetaminophen (TYLENOL) 325 MG tablet, Take 325-650 mg by mouth  (Patient not taking: Reported on 9/23/2021), Disp: , Rfl:     Allergies:   Lisinopril      Objective:      Physical Exam  117.3 kg (258 lb 11.2 oz)  1.803 m (5' 11\")  Body mass index is 36.08 kg/m .  BP (!) 140/90 (BP Location: Right arm, Patient Position: Sitting, Cuff Size: Adult Large)   Pulse 59   Resp 16   Ht 1.803 m (5' 11\")   Wt 117.3 kg (258 lb 11.2 oz)   BMI 36.08 kg/m      General Appearance:   Awake, Alert, No acute distress.   HEENT:  Pupil equal and reactive to light. No scleral icterus; the mucous membranes were moist.   Neck: No cervical bruits. No JVD. No thyromegaly.     Chest: The spine was straight. The chest was symmetric.   Lungs:   Respirations unlabored; Lungs are clear to auscultation. No crackles. No wheezing.   Cardiovascular:   Regular rhythm and rate, normal first and second heart sounds with no murmurs. No rubs or gallops.    Abdomen:  Obese. Soft. No tenderness. Non-distended. Bowels sounds are present   Extremities: Equal tibial pulses. Trace leg edema.   Skin: No rashes or ulcers. Warm, Dry.   Musculoskeletal: No tenderness. No deformity.   Neurologic: Mood and affect are appropriate. No focal deficits.         EKG:  Personally reivewed  Normal sinus rhythm   Cannot rule out Inferior infarct , age undetermined   Abnormal ECG   No previous ECGs available     Cardiac Imaging Studies  ECHO  On 8-:    Left Ventricle: Normal left ventricular size and systolic function.The estimated left ventricular ejection fraction is 55%. This represents a normal ejection fraction. Mild concentric hypertrophy noted. E/e' 8 to 15, which is equivocal for estimating LV filling pressures.Left ventricular diastolic function is normal.    The left ventricular wall motion is normal.    Right Ventricle: Normal right ventricular size and systolic function. TAPSE is normal, which is consistent with normal right " ventricular systolic function.    Aortic Valve: The valve is tricuspid. No aortic stenosis. Moderate aortic regurgitation with an eccentrically directed jet and toward mitral valve.    Thoracic Aorta: The ascending aorta is mildly dilated.    No previous study for comparison.    Cardiac MRA on 9-3-2021:  1.  Normal left ventricular size, wall thickness and systolic function. The quantified left ventricular  ejection fraction is 58%.  No myocardial scar is identified.    2.  Normal right ventricular size and systolic function.    3.  Tricuspid aortic valve with mild aortic regurgitation. Aortic regurgitant volume 27.8 ml. Aortic  regurgitant fraction 21.6 %.  4. No significant enlargement of thoracic aorta.     Lab Review   Lab Results   Component Value Date     08/20/2021    CO2 24 08/20/2021    BUN 26 08/20/2021     Lab Results   Component Value Date    WBC 6.0 08/20/2021    HGB 14.3 08/20/2021    HCT 41.9 08/20/2021    MCV 86 08/20/2021     08/20/2021     Lab Results   Component Value Date    CHOL 114 08/20/2021    CHOL 173 08/11/2020    CHOL 163 04/10/2018     Lab Results   Component Value Date    HDL 33 (L) 08/20/2021    HDL 34 (L) 08/11/2020    HDL 29 (L) 04/10/2018     No components found for: LDLCALC  Lab Results   Component Value Date    TRIG 167 (H) 08/20/2021    TRIG 126 08/11/2020    TRIG 131 04/10/2018     No components found for: CHOLHDL  Lab Results   Component Value Date    TROPONINI <0.01 08/10/2020     Lab Results   Component Value Date    BNP 59 08/20/2021     Lab Results   Component Value Date    TSH 2.12 07/30/2021                 Thank you for allowing me to participate in the care of your patient.      Sincerely,     Marleen Ryan MD     [facility]  cc:   No referring provider defined for this encounter.

## 2021-10-14 ENCOUNTER — TELEPHONE (OUTPATIENT)
Dept: LAB | Facility: CLINIC | Age: 61
End: 2021-10-14

## 2021-10-14 DIAGNOSIS — N17.9 AKI (ACUTE KIDNEY INJURY) (H): Primary | ICD-10-CM

## 2021-10-14 NOTE — PROGRESS NOTES
Irving is on the [facility] schedule tomorrow.  No orders are in the Chart.  Please enter orders if appropriate.  If labs are not needed please advise pt.    Thanks

## 2021-10-15 ENCOUNTER — LAB (OUTPATIENT)
Dept: LAB | Facility: CLINIC | Age: 61
End: 2021-10-15
Payer: COMMERCIAL

## 2021-10-15 DIAGNOSIS — N17.9 AKI (ACUTE KIDNEY INJURY) (H): ICD-10-CM

## 2021-10-15 LAB
ANION GAP SERPL CALCULATED.3IONS-SCNC: 9 MMOL/L (ref 5–18)
BUN SERPL-MCNC: 22 MG/DL (ref 8–22)
CALCIUM SERPL-MCNC: 9.5 MG/DL (ref 8.5–10.5)
CHLORIDE BLD-SCNC: 103 MMOL/L (ref 98–107)
CO2 SERPL-SCNC: 26 MMOL/L (ref 22–31)
CREAT SERPL-MCNC: 1.46 MG/DL (ref 0.7–1.3)
GFR SERPL CREATININE-BSD FRML MDRD: 51 ML/MIN/1.73M2
GLUCOSE BLD-MCNC: 110 MG/DL (ref 70–125)
POTASSIUM BLD-SCNC: 4.6 MMOL/L (ref 3.5–5)
SODIUM SERPL-SCNC: 138 MMOL/L (ref 136–145)

## 2021-10-15 PROCEDURE — 80048 BASIC METABOLIC PNL TOTAL CA: CPT

## 2021-10-15 PROCEDURE — 36415 COLL VENOUS BLD VENIPUNCTURE: CPT

## 2021-10-17 ENCOUNTER — HEALTH MAINTENANCE LETTER (OUTPATIENT)
Age: 61
End: 2021-10-17

## 2021-10-20 ENCOUNTER — MYC REFILL (OUTPATIENT)
Dept: FAMILY MEDICINE | Facility: CLINIC | Age: 61
End: 2021-10-20

## 2021-10-20 DIAGNOSIS — E78.5 HYPERLIPIDEMIA, UNSPECIFIED HYPERLIPIDEMIA TYPE: ICD-10-CM

## 2021-10-21 RX ORDER — ATORVASTATIN CALCIUM 80 MG/1
80 TABLET, FILM COATED ORAL AT BEDTIME
Qty: 90 TABLET | Refills: 2 | Status: SHIPPED | OUTPATIENT
Start: 2021-10-21 | End: 2022-11-14

## 2021-10-21 NOTE — TELEPHONE ENCOUNTER
"Last Written Prescription Date:  11/9/2020  Last Fill Quantity: 90,  # refills: 3   Last office visit provider: 8/20/21      Requested Prescriptions   Pending Prescriptions Disp Refills     atorvastatin (LIPITOR) 80 MG tablet 90 tablet 3     Sig: Take 1 tablet (80 mg) by mouth At Bedtime       [facility] Protocol Passed - 10/20/2021 10:42 AM        Passed - LDL on file in past 12 months     Recent Labs   Lab Test 08/20/21  1649   LDL 48             Passed - No abnormal creatine kinase in past 12 months     No lab results found.             Passed - Recent (12 mo) or future (30 days) visit within the authorizing provider's specialty     Patient has had an office visit with the authorizing provider or a provider within the authorizing providers department within the previous 12 mos or has a future within next 30 days. See \"Patient Info\" tab in inbasket, or \"Choose Columns\" in Meds & Orders section of the refill encounter.              Passed - Medication is active on med list        Passed - Patient is age 18 or older             Gabi Santiago RN 10/21/21 9:03 AM  "

## 2022-06-29 DIAGNOSIS — R60.9 EDEMA, UNSPECIFIED TYPE: ICD-10-CM

## 2022-06-29 DIAGNOSIS — R63.5 WEIGHT GAIN: ICD-10-CM

## 2022-06-30 RX ORDER — FUROSEMIDE 20 MG
TABLET ORAL
Qty: 90 TABLET | Refills: 0 | Status: SHIPPED | OUTPATIENT
Start: 2022-06-30 | End: 2022-11-14

## 2022-06-30 NOTE — TELEPHONE ENCOUNTER
"Routing refill request to provider for review/approval because:  Labs out of range:  Creatinine  BP not in range.    Last Written Prescription Date:  12/29/21  Last Fill Quantity: 90,  # refills: 1   Last office visit provider:  8/20/21     Requested Prescriptions   Pending Prescriptions Disp Refills     furosemide (LASIX) 20 MG tablet [Pharmacy Med Name: Furosemide 20 MG Oral Tablet] 90 tablet 0     Sig: Take 1 tablet by mouth once daily       Diuretics (Including Combos) Protocol Failed - 6/30/2022 11:25 AM        Failed - Blood pressure under 140/90 in past 12 months     BP Readings from Last 3 Encounters:   09/23/21 (!) 140/90   08/20/21 (!) 144/76   08/10/21 136/76                 Failed - Normal serum creatinine on file in past 12 months     Recent Labs   Lab Test 10/15/21  0847   CR 1.46*              Passed - Recent (12 mo) or future (30 days) visit within the authorizing provider's specialty     Patient has had an office visit with the authorizing provider or a provider within the authorizing providers department within the previous 12 mos or has a future within next 30 days. See \"Patient Info\" tab in inbasket, or \"Choose Columns\" in Meds & Orders section of the refill encounter.              Passed - Medication is active on med list        Passed - Patient is age 18 or older        Passed - Normal serum potassium on file in past 12 months     Recent Labs   Lab Test 10/15/21  0847   POTASSIUM 4.6                    Passed - Normal serum sodium on file in past 12 months     Recent Labs   Lab Test 10/15/21  0847                      Rodney Ruth RN 06/30/22 11:25 AM  "

## 2022-09-08 DIAGNOSIS — I10 ESSENTIAL HYPERTENSION: ICD-10-CM

## 2022-09-08 RX ORDER — CARVEDILOL 25 MG/1
TABLET ORAL
Qty: 90 TABLET | Refills: 0 | Status: SHIPPED | OUTPATIENT
Start: 2022-09-08 | End: 2022-10-24

## 2022-10-02 ENCOUNTER — HEALTH MAINTENANCE LETTER (OUTPATIENT)
Age: 62
End: 2022-10-02

## 2022-11-14 ENCOUNTER — OFFICE VISIT (OUTPATIENT)
Dept: CARDIOLOGY | Facility: CLINIC | Age: 62
End: 2022-11-14
Payer: COMMERCIAL

## 2022-11-14 VITALS
SYSTOLIC BLOOD PRESSURE: 198 MMHG | RESPIRATION RATE: 16 BRPM | HEART RATE: 70 BPM | OXYGEN SATURATION: 98 % | WEIGHT: 249 LBS | BODY MASS INDEX: 34.73 KG/M2 | DIASTOLIC BLOOD PRESSURE: 90 MMHG

## 2022-11-14 DIAGNOSIS — N18.31 STAGE 3A CHRONIC KIDNEY DISEASE (H): ICD-10-CM

## 2022-11-14 DIAGNOSIS — E78.5 DYSLIPIDEMIA, GOAL LDL BELOW 100: ICD-10-CM

## 2022-11-14 DIAGNOSIS — I35.1 NONRHEUMATIC AORTIC VALVE INSUFFICIENCY: ICD-10-CM

## 2022-11-14 DIAGNOSIS — I10 UNCONTROLLED HYPERTENSION: Primary | ICD-10-CM

## 2022-11-14 PROCEDURE — 99215 OFFICE O/P EST HI 40 MIN: CPT | Performed by: INTERNAL MEDICINE

## 2022-11-14 RX ORDER — FUROSEMIDE 20 MG
20 TABLET ORAL DAILY
Qty: 90 TABLET | Refills: 3 | Status: SHIPPED | OUTPATIENT
Start: 2022-11-14

## 2022-11-14 RX ORDER — CARVEDILOL 25 MG/1
25 TABLET ORAL 2 TIMES DAILY WITH MEALS
Qty: 180 TABLET | Refills: 3 | Status: SHIPPED | OUTPATIENT
Start: 2022-11-14 | End: 2024-06-28

## 2022-11-14 RX ORDER — LOSARTAN POTASSIUM AND HYDROCHLOROTHIAZIDE 12.5; 5 MG/1; MG/1
1 TABLET ORAL DAILY
Qty: 90 TABLET | Refills: 3 | Status: SHIPPED | OUTPATIENT
Start: 2022-11-14 | End: 2024-07-01

## 2022-11-14 RX ORDER — ATORVASTATIN CALCIUM 80 MG/1
80 TABLET, FILM COATED ORAL AT BEDTIME
Qty: 90 TABLET | Refills: 3 | Status: SHIPPED | OUTPATIENT
Start: 2022-11-14

## 2022-11-14 NOTE — PROGRESS NOTES
Assessment/Plan:   1.  Aortic valve regurgitation: The patient had cardiac MRA which is reported mild aortic valve regurgitation.  Normal left and the right ventricular systolic function.  No aortic root enlargement.       2.  Uncontrolled hypertension: Her blood pressure is very high.    We spent a lot of time to discuss the importance of blood pressure control because he already has stage III CKD and a history of ischemic or stroke.  Continue carvedilol 25 twice a day.  He could not tolerate to amlodipine due to side effect of her dizziness.  Started losartan-hydrochlorothiazide 50--12.5 mg daily.  He is going to check his blood pressure daily in the next several days and then call me back.  We will adjust his medications as indicated.  We discussed low-salt diet and lifestyle modification.     3.  Dyslipidemia: Continue Lipitor 80 mg at bedtime.  LDL was controlled.    4.  Stage III CKD: Follow-up with Dr. Del Toro.     5.  Ischemic stroke secondary to high-grade narrowing of intracranial vertebral arteries: Follow-up with neurology clinic.    Total 51 minutes were spent in this visit for face to face visit, physical exam, review of current labs/imaging studies, plan for ongoing treatment with >50% spent on counseling and coordination of care as documented in the above mentioned note.      Thank you for the opportunity to be involved in the care of Irving Baca. If you have any questions, please feel free to contact me.  I will see the patient again in 2 months and as needed.    Much or all of the text in this note was generated through the use of Dragon Dictate voice-to-text software. Errors in spelling or words which seem out of context are unintentional. Sound alike errors, in particular, may have escaped editing.       History of Present Illness:   It is my pleasure to see Irving Baca at the [facility] clinic for routine cardiology follow up.  Irving Baca is a 62 year old  male with a medical history of aortic valve regurgitation, essential hypertension, dyslipidemia, history of DVT of left lower extremity, ischemic stroke.    The patient states that he has been doing quite well since the last visit.  He denies chest pain, shortness of breath, palpitations, dizziness, orthopnea, PND.  His leg edema has been well controlled with 20 mg of Lasix daily.  His blood pressure is very high 200/90 mmHg    Past Medical History:     Patient Active Problem List   Diagnosis     Dyslipidemia, goal LDL below 100     Prehypertension     ED (erectile dysfunction)     Dysphagia     Acute ischemic stroke (H)     SDH (subdural hematoma)     Pain of left calf     Moderate aortic regurgitation     Ascending aorta dilation (H)     Impaired gait and mobility     Deep vein thrombosis (DVT) of distal vein of left lower extremity (H)     Essential hypertension     Nonrheumatic aortic valve insufficiency     Morbid obesity (H)       Past Surgical History:   No past surgical history on file.    Family History:   No family history on file.     Social History:    reports that he has never smoked. He has never used smokeless tobacco. He reports current alcohol use. He reports that he does not use drugs.    Review of Systems:   12 systems are reviewed negative except for in HPI.    Meds:     Current Outpatient Medications:      aspirin 81 MG EC tablet, Take 81 mg by mouth See Admin Instructions Taking in the evening., Disp: , Rfl:      atorvastatin (LIPITOR) 80 MG tablet, Take 1 tablet (80 mg) by mouth At Bedtime, Disp: 90 tablet, Rfl: 3     carvedilol (COREG) 25 MG tablet, Take 1 tablet (25 mg) by mouth 2 times daily (with meals), Disp: 180 tablet, Rfl: 3     furosemide (LASIX) 20 MG tablet, Take 1 tablet (20 mg) by mouth daily, Disp: 90 tablet, Rfl: 3     losartan-hydrochlorothiazide (HYZAAR) 50-12.5 MG tablet, Take 1 tablet by mouth daily, Disp: 90 tablet, Rfl: 3    Allergies:   Lisinopril      Objective:       Physical Exam  112.9 kg (249 lb)     Body mass index is 34.73 kg/m .  BP (!) 198/90 (BP Location: Right arm, Patient Position: Sitting, Cuff Size: Adult Large)   Pulse 70   Resp 16   Wt 112.9 kg (249 lb)   SpO2 98%   BMI 34.73 kg/m      General Appearance:   Awake, Alert, No acute distress.   HEENT:  Pupil equal and reactive to light. No scleral icterus; the mucous membranes were moist.   Neck: No cervical bruits. No JVD. No thyromegaly.     Chest: The spine was straight. The chest was symmetric.   Lungs:   Respirations unlabored; Lungs are clear to auscultation. No crackles. No wheezing.   Cardiovascular:   Regular rhythm and rate, normal first and second heart sounds with no murmurs. No rubs or gallops.    Abdomen:  Obese. Soft. No tenderness. Non-distended. Bowels sounds are present   Extremities: Equal tibial pulses. Trace leg edema.   Skin: No rashes or ulcers. Warm, Dry.   Musculoskeletal: No tenderness. No deformity.   Neurologic: Mood and affect are appropriate. No focal deficits.         EKG:  Personally reivewed  Normal sinus rhythm   Cannot rule out Inferior infarct , age undetermined   Abnormal ECG   No previous ECGs available     Cardiac Imaging Studies  ECHO  On 8-:    Left Ventricle: Normal left ventricular size and systolic function.The estimated left ventricular ejection fraction is 55%. This represents a normal ejection fraction. Mild concentric hypertrophy noted. E/e' 8 to 15, which is equivocal for estimating LV filling pressures.Left ventricular diastolic function is normal.    The left ventricular wall motion is normal.    Right Ventricle: Normal right ventricular size and systolic function. TAPSE is normal, which is consistent with normal right ventricular systolic function.    Aortic Valve: The valve is tricuspid. No aortic stenosis. Moderate aortic regurgitation with an eccentrically directed jet and toward mitral valve.    Thoracic Aorta: The ascending aorta is mildly  dilated.    No previous study for comparison.    Cardiac MRA on 9-3-2021:  1.  Normal left ventricular size, wall thickness and systolic function. The quantified left ventricular  ejection fraction is 58%.  No myocardial scar is identified.    2.  Normal right ventricular size and systolic function.    3.  Tricuspid aortic valve with mild aortic regurgitation. Aortic regurgitant volume 27.8 ml. Aortic  regurgitant fraction 21.6 %.  4. No significant enlargement of thoracic aorta.     Lab Review   Lab Results   Component Value Date     08/20/2021    CO2 24 08/20/2021    BUN 26 08/20/2021     Lab Results   Component Value Date    WBC 6.0 08/20/2021    HGB 14.3 08/20/2021    HCT 41.9 08/20/2021    MCV 86 08/20/2021     08/20/2021     Lab Results   Component Value Date    CHOL 114 08/20/2021    CHOL 173 08/11/2020    CHOL 163 04/10/2018     Lab Results   Component Value Date    HDL 33 (L) 08/20/2021    HDL 34 (L) 08/11/2020    HDL 29 (L) 04/10/2018     No components found for: LDLCALC  Lab Results   Component Value Date    TRIG 167 (H) 08/20/2021    TRIG 126 08/11/2020    TRIG 131 04/10/2018     No results found for: CHOLHDL  Lab Results   Component Value Date    TROPONINI <0.01 08/10/2020     Lab Results   Component Value Date    BNP 59 08/20/2021     Lab Results   Component Value Date    TSH 2.12 07/30/2021

## 2022-11-14 NOTE — LETTER
11/14/2022    Chance Del Toro MD, MD  [address]  [facility] [address]    RE: Irving Baca       Dear Colleague,     I had the pleasure of seeing Irving Baca in the [facility].            Assessment/Plan:   1.  Aortic valve regurgitation: The patient had cardiac MRA which is reported mild aortic valve regurgitation.  Normal left and the right ventricular systolic function.  No aortic root enlargement.       2.  Uncontrolled hypertension: Her blood pressure is very high.    We spent a lot of time to discuss the importance of blood pressure control because he already has stage III CKD and a history of ischemic or stroke.  Continue carvedilol 25 twice a day.  He could not tolerate to amlodipine due to side effect of her dizziness.  Started losartan-hydrochlorothiazide 50--12.5 mg daily.  He is going to check his blood pressure daily in the next several days and then call me back.  We will adjust his medications as indicated.  We discussed low-salt diet and lifestyle modification.     3.  Dyslipidemia: Continue Lipitor 80 mg at bedtime.  LDL was controlled.    4.  Stage III CKD: Follow-up with Dr. Del Toro.     5.  Ischemic stroke secondary to high-grade narrowing of intracranial vertebral arteries: Follow-up with neurology clinic.    Total 51 minutes were spent in this visit for face to face visit, physical exam, review of current labs/imaging studies, plan for ongoing treatment with >50% spent on counseling and coordination of care as documented in the above mentioned note.      Thank you for the opportunity to be involved in the care of Irving Baca. If you have any questions, please feel free to contact me.  I will see the patient again in 2 months and as needed.    Much or all of the text in this note was generated through the use of Dragon Dictate voice-to-text software. Errors in spelling or words which seem out of context are unintentional. Sound alike errors, in particular,  may have escaped editing.       History of Present Illness:   It is my pleasure to see Irving Baca at the [facility] clinic for routine cardiology follow up.  Irving Baca is a 62 year old male with a medical history of aortic valve regurgitation, essential hypertension, dyslipidemia, history of DVT of left lower extremity, ischemic stroke.    The patient states that he has been doing quite well since the last visit.  He denies chest pain, shortness of breath, palpitations, dizziness, orthopnea, PND.  His leg edema has been well controlled with 20 mg of Lasix daily.  His blood pressure is very high 200/90 mmHg    Past Medical History:     Patient Active Problem List   Diagnosis     Dyslipidemia, goal LDL below 100     Prehypertension     ED (erectile dysfunction)     Dysphagia     Acute ischemic stroke (H)     SDH (subdural hematoma)     Pain of left calf     Moderate aortic regurgitation     Ascending aorta dilation (H)     Impaired gait and mobility     Deep vein thrombosis (DVT) of distal vein of left lower extremity (H)     Essential hypertension     Nonrheumatic aortic valve insufficiency     Morbid obesity (H)       Past Surgical History:   No past surgical history on file.    Family History:   No family history on file.     Social History:    reports that he has never smoked. He has never used smokeless tobacco. He reports current alcohol use. He reports that he does not use drugs.    Review of Systems:   12 systems are reviewed negative except for in HPI.    Meds:     Current Outpatient Medications:      aspirin 81 MG EC tablet, Take 81 mg by mouth See Admin Instructions Taking in the evening., Disp: , Rfl:      atorvastatin (LIPITOR) 80 MG tablet, Take 1 tablet (80 mg) by mouth At Bedtime, Disp: 90 tablet, Rfl: 3     carvedilol (COREG) 25 MG tablet, Take 1 tablet (25 mg) by mouth 2 times daily (with meals), Disp: 180 tablet, Rfl: 3     furosemide (LASIX) 20 MG tablet, Take 1 tablet  (20 mg) by mouth daily, Disp: 90 tablet, Rfl: 3     losartan-hydrochlorothiazide (HYZAAR) 50-12.5 MG tablet, Take 1 tablet by mouth daily, Disp: 90 tablet, Rfl: 3    Allergies:   Lisinopril      Objective:      Physical Exam  112.9 kg (249 lb)     Body mass index is 34.73 kg/m .  BP (!) 198/90 (BP Location: Right arm, Patient Position: Sitting, Cuff Size: Adult Large)   Pulse 70   Resp 16   Wt 112.9 kg (249 lb)   SpO2 98%   BMI 34.73 kg/m      General Appearance:   Awake, Alert, No acute distress.   HEENT:  Pupil equal and reactive to light. No scleral icterus; the mucous membranes were moist.   Neck: No cervical bruits. No JVD. No thyromegaly.     Chest: The spine was straight. The chest was symmetric.   Lungs:   Respirations unlabored; Lungs are clear to auscultation. No crackles. No wheezing.   Cardiovascular:   Regular rhythm and rate, normal first and second heart sounds with no murmurs. No rubs or gallops.    Abdomen:  Obese. Soft. No tenderness. Non-distended. Bowels sounds are present   Extremities: Equal tibial pulses. Trace leg edema.   Skin: No rashes or ulcers. Warm, Dry.   Musculoskeletal: No tenderness. No deformity.   Neurologic: Mood and affect are appropriate. No focal deficits.         EKG:  Personally reivewed  Normal sinus rhythm   Cannot rule out Inferior infarct , age undetermined   Abnormal ECG   No previous ECGs available     Cardiac Imaging Studies  ECHO  On 8-:    Left Ventricle: Normal left ventricular size and systolic function.The estimated left ventricular ejection fraction is 55%. This represents a normal ejection fraction. Mild concentric hypertrophy noted. E/e' 8 to 15, which is equivocal for estimating LV filling pressures.Left ventricular diastolic function is normal.    The left ventricular wall motion is normal.    Right Ventricle: Normal right ventricular size and systolic function. TAPSE is normal, which is consistent with normal right ventricular systolic  function.    Aortic Valve: The valve is tricuspid. No aortic stenosis. Moderate aortic regurgitation with an eccentrically directed jet and toward mitral valve.    Thoracic Aorta: The ascending aorta is mildly dilated.    No previous study for comparison.    Cardiac MRA on 9-3-2021:  1.  Normal left ventricular size, wall thickness and systolic function. The quantified left ventricular  ejection fraction is 58%.  No myocardial scar is identified.    2.  Normal right ventricular size and systolic function.    3.  Tricuspid aortic valve with mild aortic regurgitation. Aortic regurgitant volume 27.8 ml. Aortic  regurgitant fraction 21.6 %.  4. No significant enlargement of thoracic aorta.     Lab Review   Lab Results   Component Value Date     08/20/2021    CO2 24 08/20/2021    BUN 26 08/20/2021     Lab Results   Component Value Date    WBC 6.0 08/20/2021    HGB 14.3 08/20/2021    HCT 41.9 08/20/2021    MCV 86 08/20/2021     08/20/2021     Lab Results   Component Value Date    CHOL 114 08/20/2021    CHOL 173 08/11/2020    CHOL 163 04/10/2018     Lab Results   Component Value Date    HDL 33 (L) 08/20/2021    HDL 34 (L) 08/11/2020    HDL 29 (L) 04/10/2018     No components found for: LDLCALC  Lab Results   Component Value Date    TRIG 167 (H) 08/20/2021    TRIG 126 08/11/2020    TRIG 131 04/10/2018     No results found for: CHOLHDL  Lab Results   Component Value Date    TROPONINI <0.01 08/10/2020     Lab Results   Component Value Date    BNP 59 08/20/2021     Lab Results   Component Value Date    TSH 2.12 07/30/2021             Thank you for allowing me to participate in the care of your patient.      Sincerely,     Marleen Ryan MD     [facility]  cc:   No referring provider defined for this encounter.

## 2023-07-24 ENCOUNTER — MYC MEDICAL ADVICE (OUTPATIENT)
Dept: FAMILY MEDICINE | Facility: CLINIC | Age: 63
End: 2023-07-24

## 2023-07-24 NOTE — LETTER
August 22, 2024      Irving Baca  [address]  [place] [facility] [address]        Dear Irving,      We are reaching out because according to our records, your last recorded blood pressure is above your provider's recommended guidelines.  Managing you blood pressure is important to us and we want to give you the care you need.      Please choose one of the three options below:    Take your blood pressure with an automatic machine and call us at [phone] or send us a [employer] message to report the results.  Go to any participating [place] pharmacy to get your blood pressure checked.    Call [facility] at [phone] to schedule a free nurse only blood pressure visit.     If you have had your blood pressure taken outside the [facility] system, please provide us with that information so it can be documented in your chart.          Sincerely,    Clara Harry RN

## 2023-10-21 ENCOUNTER — HEALTH MAINTENANCE LETTER (OUTPATIENT)
Age: 63
End: 2023-10-21

## 2024-06-28 DIAGNOSIS — I35.1 NONRHEUMATIC AORTIC VALVE INSUFFICIENCY: ICD-10-CM

## 2024-06-28 DIAGNOSIS — I10 UNCONTROLLED HYPERTENSION: Primary | ICD-10-CM

## 2024-06-28 DIAGNOSIS — I10 ESSENTIAL HYPERTENSION: ICD-10-CM

## 2024-06-28 RX ORDER — CARVEDILOL 25 MG/1
25 TABLET ORAL 2 TIMES DAILY WITH MEALS
Qty: 180 TABLET | Refills: 0 | Status: SHIPPED | OUTPATIENT
Start: 2024-06-28

## 2024-07-01 ENCOUNTER — TELEPHONE (OUTPATIENT)
Dept: CARDIOLOGY | Facility: CLINIC | Age: 64
End: 2024-07-01

## 2024-07-01 DIAGNOSIS — I10 UNCONTROLLED HYPERTENSION: Primary | ICD-10-CM

## 2024-07-01 DIAGNOSIS — I10 ESSENTIAL HYPERTENSION: ICD-10-CM

## 2024-07-01 RX ORDER — LOSARTAN POTASSIUM AND HYDROCHLOROTHIAZIDE 12.5; 5 MG/1; MG/1
1 TABLET ORAL DAILY
Qty: 90 TABLET | Refills: 0 | Status: SHIPPED | OUTPATIENT
Start: 2024-07-01 | End: 2024-09-17 | Stop reason: ALTCHOICE

## 2024-07-01 NOTE — TELEPHONE ENCOUNTER
[facility] Call Center    Phone Message    May a detailed message be left on voicemail: yes     Reason for Call: Medication Refill Request    Has the patient contacted the pharmacy for the refill? Yes   Name of medication being requested:   losartan-hydrochlorothiazide (HYZAAR) 50-12.5 MG tablet    Provider who prescribed the medication: Dr Ryan  Pharmacy: [facility] [address] [place] [address] [address] [place] [address]   Date medication is needed: 7/2/2024   This request came over with the request for Carvedilol, patient Is out    Action Taken: Other: Cardiology    Travel Screening: Not Applicable    Thank you!  [facility]       Date of Service:

## 2024-07-01 NOTE — TELEPHONE ENCOUNTER
----- Message from Jennifer COMBS sent at 6/28/2024  8:37 AM CDT -----  Regarding: RE: DL pt  Patient doesn't understand why he has to see A PA and then see Dr. Ryan in October, I explained he wouldn't need to see Dr Ryan necessarily in October it would depend on what the PA felt he needed.  Then he doesn't know why he has to see a cardiologist because it seems he doesn't communicate with his pmd........  He scheduled with Dr. Ryan because he likes Dr. Ryan so why would he schedule with the PA?  Plz call - thank you  ----- Message -----  From: Gladys Pratt RN  Sent: 6/28/2024   7:30 AM CDT  To: [facility] Scheduling Registration Pool - [facility]  Subject: DL pt                                            Please sched sooner follow-up w/ELIZABETH for Rx refills - pt last seen 11-14-22 and was due for yrly follow-up 11/2023 - Thanks

## 2024-07-01 NOTE — TELEPHONE ENCOUNTER
"Return call to patient - informed patient that he is overdue for yrly follow-up which is required for Rx refills - explained that courtesy refill had been provided for Coreg recently and that his PCP could manage Rx's until seen or just follow-up as needed with Dr. Ryan.    Patient provided lengthy explanation of cost of seeing providers and \"nothing is done\" and that he \"just won't take meds until he is seen in October\" - strongly advised patient to not stop any Rx's unless provider advises - patient then reported that he sometimes experiences dizziness when he stands up - explained to patient that med adjustments may need to be made which is all the more reason to be seen sooner.    Patient continued to c/o what occurs at physician visits and cost, then stated he hasn't been in agreement with PCP before writer noting patient's last visit w/PCP was 8-20-21- patient then admitted he hasn't been taking most of his meds.    Informed patient of possible complications associated with not taking Rx's and strongly advised he sched next available follow-up with ELIZABETH for evaluation and Rx refills - patient agreed - call transf to schedGiovany morocho  "

## 2024-08-09 ENCOUNTER — PATIENT OUTREACH (OUTPATIENT)
Dept: CARE COORDINATION | Facility: CLINIC | Age: 64
End: 2024-08-09
Payer: COMMERCIAL

## 2024-08-22 NOTE — TELEPHONE ENCOUNTER
Writer called patient and left message for patient to return call to clinic  regarding recent blood pressures.    Writer left patient message to return call to clinic regarding blood pressure. Please let patient know that patient's last recorded blood pressure is above their provider's recommended guidelines and it is recommended they come to clinic for a BP check appointment. If patient agrees with plan please assist with scheduling appointment. If patient takes blood pressure at home please ask them for their most recent reading and the date it was taken. Route information back to writer to update chart. .    ARACELI Anand, RN  [facility]    Patient Quality Outreach    Patient is due for the following:   Hypertension -  Hypertension follow-up visit    Next Steps:   Schedule a Adult Preventative    Type of outreach:    Sent [employer] message.    Next Steps:  Reach out within 90 days via Phone and Letter.    Max number of attempts reached: Yes. Will try again in 90 days if patient still on fail list.    Questions for provider review:    None           Clara Harry RN

## 2024-09-10 NOTE — PROGRESS NOTES
HEART CARE ENCOUNTER CONSULTATON NOTE      [facility]  [phone]      Assessment/Recommendations   Assessment:   Hypertension: Elevated 176/80 on Carvedilol 25 mg twice daily only.  Losartan-HCTZ 50-12.5 mg discontinued due to dizziness when taken together with carvedilol.  When restarting both tablets suddenly may have been too large of a drop in blood pressure at once.  - Intolerance is to lisinopril and it in the past.   Aortic regurgitation: Mild per cardiac MRI/MRA 9/2021, no significant aortic root/ascending dilation  Dyslipidemia: No longer taking atorvastatin 80 mg daily.  Believes this caused side effects, difficult to swallow large pill following stroke.  Hx ischemic CVA: Aspirin 81 mg daily  CKD 3: Last creatinine 10/21 1.46    Plan:   Lipid panel and BMP today.  Concern for progression of CKD with uncontrolled blood pressure.  With appropriate creatinine would add losartan 12.5 mg daily alone, slow titration to avoid side effects/history of lightheadedness/dizziness.  Then, repeat BMP in 10-14 days.  Continue carvedilol 25 mg twice daily, aspirin 81 mg daily  LDL cholesterol goal less than 100, would recommend starting low-dose rosuvastatin 5 mg if above goal.  Recommend reestablishing or establishing with new PCP        Follow up in 2-3 months for BP visit or sooner as needed     History of Present Illness/Subjective    HPI: Irving Baca is a 64 year old male with PMHx of regurgitation, HTN, HLD, CVA, CKD presents for follow up.  Last cardiology visit 11/2022, PCP visit in 2021.    She has been lost to follow-up.  He ran out of all of his medications 3/2024.  Had not been on them until recently last month.  He restarted carvedilol, losartan-HCTZ combination.  Immediately experienced increased lightheadedness and dizziness though he discontinued it.  Was consistent with orthostatic symptoms and position changes.  During that time systolic BP recalled to be 115, thinks this is  too low for him.  Feels well on carvedilol alone.  Stopped monitoring his blood pressure because his blood pressure cuff does not go on/fit well his arm.    He denies lightheadedness, shortness of breath, dyspnea on exertion, orthopnea, PND, palpitations, chest pain, and lower extremity edema.      MRA Cardiac aorta 9/2021  1.  Normal left ventricular size, wall thickness and systolic function. The quantified left ventricular  ejection fraction is 58%.  No myocardial scar is identified.    2.  Normal right ventricular size and systolic function.    3.  Tricuspid aortic valve with mild aortic regurgitation. Aortic regurgitant volume 27.8 ml. Aortic  regurgitant fraction 21.6 %.  4. No significant enlargement of thoracic aorta.      THORACIC AORTA: 3D MRA. No aortic dissection or coarctation.   Aortic root measures (sinus of Valsalva): 36x 37 x 37  mm.   Aorta at the sinotubular junction measures 32 mm.   Ascending aorta at the right pulmonary artery measures: 38 mm.   Proximal aortic arch: 35 mm   Distal aortic arch: 26 mm   Proximal descending aorta: 28mm   Distal thoracic descending aorta at level of diaphragm: 25 mm.     TTE 8/2020    Left Ventricle: Normal left ventricular size and systolic function.The estimated left ventricular ejection fraction is 55%. This represents a normal ejection fraction. Mild concentric hypertrophy noted. E/e' 8 to 15, which is equivocal for estimating   LV filling pressures.Left ventricular diastolic function is normal.    The left ventricular wall motion is normal.    Right Ventricle: Normal right ventricular size and systolic function. TAPSE is normal, which is consistent with normal right ventricular systolic function.    Aortic Valve: The valve is tricuspid. No aortic stenosis. Moderate aortic regurgitation with an eccentrically directed jet and toward mitral valve.    Thoracic Aorta: The ascending aorta is mildly dilated.    No previous study for comparison.     Physical  Examination  Review of Systems   Vitals: BP (!) 176/80 (BP Location: Right arm, Patient Position: Sitting, Cuff Size: Adult Large)   Pulse 62   Resp 16   Wt 116.6 kg (257 lb)   BMI 35.84 kg/m    BMI= Body mass index is 35.84 kg/m .  Wt Readings from Last 3 Encounters:   09/11/24 116.6 kg (257 lb)   11/14/22 112.9 kg (249 lb)   09/23/21 117.3 kg (258 lb 11.2 oz)           ENT/Mouth: membranes moist, no oral lesions or bleeding gums.      EYES:  no scleral icterus, normal conjunctivae       Chest/Lungs:   lungs are clear to auscultation, no rales or wheezing, equal chest wall expansion    Cardiovascular:   Regular. Normal first and second heart sounds with no murmurs, rubs, or gallops; the radial and posterior tibial pulses are intact,  absent edema bilaterally    Abdomen:  no tenderness; bowel sounds are present   Extremities: no cyanosis or clubbing   Skin: no xanthelasma, warm.    Neurologic: normal  bilateral, no tremors     Psychiatric: alert and oriented x3, calm        Please refer above for cardiac ROS details.        Medical History  Surgical History Family History Social History   No past medical history on file.  No past surgical history on file.  No family history on file.     Social History     Socioeconomic History    Marital status: Single     Spouse name: Not on file    Number of children: Not on file    Years of education: Not on file    Highest education level: Not on file   Occupational History    Not on file   Tobacco Use    Smoking status: Never    Smokeless tobacco: Never   Substance and Sexual Activity    Alcohol use: Yes    Drug use: Never    Sexual activity: Not on file   Other Topics Concern    Not on file   Social History Narrative    Not on file     Social Determinants of Health     Financial Resource Strain: Not on file   Food Insecurity: Not on file   Transportation Needs: Not on file   Physical Activity: Not on file   Stress: Not on file   Social Connections: Not on file  "  Interpersonal Safety: Not on file   Housing Stability: Not on file           Medications  Allergies   Current Outpatient Medications   Medication Sig Dispense Refill    aspirin 81 MG EC tablet Take 81 mg by mouth See Admin Instructions Taking in the evening.      carvedilol (COREG) 25 MG tablet Take 1 tablet (25 mg) by mouth 2 times daily (with meals) 180 tablet 0    atorvastatin (LIPITOR) 80 MG tablet Take 1 tablet (80 mg) by mouth At Bedtime (Patient not taking: Reported on 9/11/2024) 90 tablet 3    furosemide (LASIX) 20 MG tablet Take 1 tablet (20 mg) by mouth daily (Patient not taking: Reported on 9/11/2024) 90 tablet 3    losartan-hydrochlorothiazide (HYZAAR) 50-12.5 MG tablet Take 1 tablet by mouth daily (Patient not taking: Reported on 9/11/2024) 90 tablet 0       Allergies   Allergen Reactions    Lisinopril Cough          Lab Results    Chemistry/lipid CBC Cardiac Enzymes/BNP/TSH/INR   Recent Labs   Lab Test 08/20/21  1649   CHOL 114   HDL 33*   LDL 48   TRIG 167*     Recent Labs   Lab Test 08/20/21  1649 08/11/20  0509 04/10/18  1005   LDL 48 114 108     Recent Labs   Lab Test 10/15/21  0847      POTASSIUM 4.6   CHLORIDE 103   CO2 26      BUN 22   CR 1.46*   GFRESTIMATED 51*   FREYA 9.5     Recent Labs   Lab Test 10/15/21  0847 08/20/21  1645 07/30/21  1620   CR 1.46* 2.03* 1.86*     No results for input(s): \"A1C\" in the last 88356 hours.       Recent Labs   Lab Test 08/20/21  1645   WBC 6.0   HGB 14.3   HCT 41.9   MCV 86        Recent Labs   Lab Test 08/20/21  1645 07/30/21  1621 08/10/20  1425   HGB 14.3 14.0 16.0    Recent Labs   Lab Test 08/10/20  1425   TROPONINI <0.01     Recent Labs   Lab Test 08/20/21  1645 07/30/21  1620   BNP 59* 117*     Recent Labs   Lab Test 07/30/21  1620   TSH 2.12     Recent Labs   Lab Test 08/10/20  1425   INR 1.02          This note has been dictated using voice recognition software. Any grammatical, typographical, or context distortions are " unintentional and inherent to the software    Soila Rome PA-C

## 2024-09-11 ENCOUNTER — OFFICE VISIT (OUTPATIENT)
Dept: CARDIOLOGY | Facility: CLINIC | Age: 64
End: 2024-09-11
Payer: MEDICARE

## 2024-09-11 VITALS
BODY MASS INDEX: 35.84 KG/M2 | HEART RATE: 62 BPM | RESPIRATION RATE: 16 BRPM | WEIGHT: 257 LBS | SYSTOLIC BLOOD PRESSURE: 176 MMHG | DIASTOLIC BLOOD PRESSURE: 80 MMHG

## 2024-09-11 DIAGNOSIS — I10 BENIGN ESSENTIAL HYPERTENSION: Primary | ICD-10-CM

## 2024-09-11 DIAGNOSIS — I77.810 ASCENDING AORTA DILATION (H): ICD-10-CM

## 2024-09-11 DIAGNOSIS — I35.1 NONRHEUMATIC AORTIC VALVE INSUFFICIENCY: ICD-10-CM

## 2024-09-11 DIAGNOSIS — E78.5 DYSLIPIDEMIA, GOAL LDL BELOW 100: ICD-10-CM

## 2024-09-11 DIAGNOSIS — I35.1 MODERATE AORTIC REGURGITATION: ICD-10-CM

## 2024-09-11 DIAGNOSIS — I63.9 CEREBROVASCULAR ACCIDENT (CVA), UNSPECIFIED MECHANISM (H): ICD-10-CM

## 2024-09-11 LAB
ANION GAP SERPL CALCULATED.3IONS-SCNC: 7 MMOL/L (ref 7–15)
BUN SERPL-MCNC: 24.3 MG/DL (ref 8–23)
CALCIUM SERPL-MCNC: 9.1 MG/DL (ref 8.8–10.4)
CHLORIDE SERPL-SCNC: 103 MMOL/L (ref 98–107)
CHOLEST SERPL-MCNC: 156 MG/DL
CREAT SERPL-MCNC: 1.67 MG/DL (ref 0.67–1.17)
EGFRCR SERPLBLD CKD-EPI 2021: 45 ML/MIN/1.73M2
FASTING STATUS PATIENT QL REPORTED: ABNORMAL
FASTING STATUS PATIENT QL REPORTED: NORMAL
GLUCOSE SERPL-MCNC: 96 MG/DL (ref 70–99)
HCO3 SERPL-SCNC: 25 MMOL/L (ref 22–29)
HDLC SERPL-MCNC: 42 MG/DL
LDLC SERPL CALC-MCNC: 89 MG/DL
NONHDLC SERPL-MCNC: 114 MG/DL
POTASSIUM SERPL-SCNC: 4.6 MMOL/L (ref 3.4–5.3)
SODIUM SERPL-SCNC: 135 MMOL/L (ref 135–145)
TRIGL SERPL-MCNC: 126 MG/DL

## 2024-09-11 PROCEDURE — 80048 BASIC METABOLIC PNL TOTAL CA: CPT

## 2024-09-11 PROCEDURE — 99214 OFFICE O/P EST MOD 30 MIN: CPT

## 2024-09-11 PROCEDURE — 36415 COLL VENOUS BLD VENIPUNCTURE: CPT

## 2024-09-11 PROCEDURE — 80061 LIPID PANEL: CPT

## 2024-09-11 PROCEDURE — G2211 COMPLEX E/M VISIT ADD ON: HCPCS

## 2024-09-11 NOTE — PATIENT INSTRUCTIONS
It was a pleasure taking part in your care today:    - Await lab results and instruction for medications. First option would be adding back losartan alone  - Monitor BP daily. Omron blood pressure cuff is a good brand  - Establish with primary care doctor  - Bring in BP cuff to next visit, follow up with me in 2-3 months    Please call the [facility] clinic with any questions or concerns at [phone].     Soila Rome PA-C

## 2024-09-11 NOTE — LETTER
9/11/2024    Chance Del Toro MD, MD  [address]  [facility] [address]    RE: Irving Baca       Dear Colleague,     I had the pleasure of seeing Irving Baca in the [facility].    HEART CARE ENCOUNTER CONSULTATON NOTE      [facility]  [phone]      Assessment/Recommendations   Assessment:   Hypertension: Elevated 176/80 on Carvedilol 25 mg twice daily only.  Losartan-HCTZ 50-12.5 mg discontinued due to dizziness when taken together with carvedilol.  When restarting both tablets suddenly may have been too large of a drop in blood pressure at once.  - Intolerance is to lisinopril and it in the past.   Aortic regurgitation: Mild per cardiac MRI/MRA 9/2021, no significant aortic root/ascending dilation  Dyslipidemia: No longer taking atorvastatin 80 mg daily.  Believes this caused side effects, difficult to swallow large pill following stroke.  Hx ischemic CVA: Aspirin 81 mg daily  CKD 3: Last creatinine 10/21 1.46    Plan:   Lipid panel and BMP today.  Concern for progression of CKD with uncontrolled blood pressure.  With appropriate creatinine would add losartan 12.5 mg daily alone, slow titration to avoid side effects/history of lightheadedness/dizziness.  Then, repeat BMP in 10-14 days.  Continue carvedilol 25 mg twice daily, aspirin 81 mg daily  LDL cholesterol goal less than 100, would recommend starting low-dose rosuvastatin 5 mg if above goal.  Recommend reestablishing or establishing with new PCP        Follow up in 2-3 months for BP visit or sooner as needed     History of Present Illness/Subjective    HPI: Irving Baca is a 64 year old male with PMHx of regurgitation, HTN, HLD, CVA, CKD presents for follow up.  Last cardiology visit 11/2022, PCP visit in 2021.    She has been lost to follow-up.  He ran out of all of his medications 3/2024.  Had not been on them until recently last month.  He restarted carvedilol, losartan-HCTZ combination.   Immediately experienced increased lightheadedness and dizziness though he discontinued it.  Was consistent with orthostatic symptoms and position changes.  During that time systolic BP recalled to be 115, thinks this is too low for him.  Feels well on carvedilol alone.  Stopped monitoring his blood pressure because his blood pressure cuff does not go on/fit well his arm.    He denies lightheadedness, shortness of breath, dyspnea on exertion, orthopnea, PND, palpitations, chest pain, and lower extremity edema.      MRA Cardiac aorta 9/2021  1.  Normal left ventricular size, wall thickness and systolic function. The quantified left ventricular  ejection fraction is 58%.  No myocardial scar is identified.    2.  Normal right ventricular size and systolic function.    3.  Tricuspid aortic valve with mild aortic regurgitation. Aortic regurgitant volume 27.8 ml. Aortic  regurgitant fraction 21.6 %.  4. No significant enlargement of thoracic aorta.      THORACIC AORTA: 3D MRA. No aortic dissection or coarctation.   Aortic root measures (sinus of Valsalva): 36x 37 x 37  mm.   Aorta at the sinotubular junction measures 32 mm.   Ascending aorta at the right pulmonary artery measures: 38 mm.   Proximal aortic arch: 35 mm   Distal aortic arch: 26 mm   Proximal descending aorta: 28mm   Distal thoracic descending aorta at level of diaphragm: 25 mm.     TTE 8/2020    Left Ventricle: Normal left ventricular size and systolic function.The estimated left ventricular ejection fraction is 55%. This represents a normal ejection fraction. Mild concentric hypertrophy noted. E/e' 8 to 15, which is equivocal for estimating   LV filling pressures.Left ventricular diastolic function is normal.    The left ventricular wall motion is normal.    Right Ventricle: Normal right ventricular size and systolic function. TAPSE is normal, which is consistent with normal right ventricular systolic function.    Aortic Valve: The valve is tricuspid. No  aortic stenosis. Moderate aortic regurgitation with an eccentrically directed jet and toward mitral valve.    Thoracic Aorta: The ascending aorta is mildly dilated.    No previous study for comparison.     Physical Examination  Review of Systems   Vitals: BP (!) 176/80 (BP Location: Right arm, Patient Position: Sitting, Cuff Size: Adult Large)   Pulse 62   Resp 16   Wt 116.6 kg (257 lb)   BMI 35.84 kg/m    BMI= Body mass index is 35.84 kg/m .  Wt Readings from Last 3 Encounters:   09/11/24 116.6 kg (257 lb)   11/14/22 112.9 kg (249 lb)   09/23/21 117.3 kg (258 lb 11.2 oz)           ENT/Mouth: membranes moist, no oral lesions or bleeding gums.      EYES:  no scleral icterus, normal conjunctivae       Chest/Lungs:   lungs are clear to auscultation, no rales or wheezing, equal chest wall expansion    Cardiovascular:   Regular. Normal first and second heart sounds with no murmurs, rubs, or gallops; the radial and posterior tibial pulses are intact,  absent edema bilaterally    Abdomen:  no tenderness; bowel sounds are present   Extremities: no cyanosis or clubbing   Skin: no xanthelasma, warm.    Neurologic: normal  bilateral, no tremors     Psychiatric: alert and oriented x3, calm        Please refer above for cardiac ROS details.        Medical History  Surgical History Family History Social History   No past medical history on file.  No past surgical history on file.  No family history on file.     Social History     Socioeconomic History     Marital status: Single     Spouse name: Not on file     Number of children: Not on file     Years of education: Not on file     Highest education level: Not on file   Occupational History     Not on file   Tobacco Use     Smoking status: Never     Smokeless tobacco: Never   Substance and Sexual Activity     Alcohol use: Yes     Drug use: Never     Sexual activity: Not on file   Other Topics Concern     Not on file   Social History Narrative     Not on file     Social  "Determinants of Health     Financial Resource Strain: Not on file   Food Insecurity: Not on file   Transportation Needs: Not on file   Physical Activity: Not on file   Stress: Not on file   Social Connections: Not on file   Interpersonal Safety: Not on file   Housing Stability: Not on file           Medications  Allergies   Current Outpatient Medications   Medication Sig Dispense Refill     aspirin 81 MG EC tablet Take 81 mg by mouth See Admin Instructions Taking in the evening.       carvedilol (COREG) 25 MG tablet Take 1 tablet (25 mg) by mouth 2 times daily (with meals) 180 tablet 0     atorvastatin (LIPITOR) 80 MG tablet Take 1 tablet (80 mg) by mouth At Bedtime (Patient not taking: Reported on 9/11/2024) 90 tablet 3     furosemide (LASIX) 20 MG tablet Take 1 tablet (20 mg) by mouth daily (Patient not taking: Reported on 9/11/2024) 90 tablet 3     losartan-hydrochlorothiazide (HYZAAR) 50-12.5 MG tablet Take 1 tablet by mouth daily (Patient not taking: Reported on 9/11/2024) 90 tablet 0       Allergies   Allergen Reactions     Lisinopril Cough          Lab Results    Chemistry/lipid CBC Cardiac Enzymes/BNP/TSH/INR   Recent Labs   Lab Test 08/20/21  1649   CHOL 114   HDL 33*   LDL 48   TRIG 167*     Recent Labs   Lab Test 08/20/21  1649 08/11/20  0509 04/10/18  1005   LDL 48 114 108     Recent Labs   Lab Test 10/15/21  0847      POTASSIUM 4.6   CHLORIDE 103   CO2 26      BUN 22   CR 1.46*   GFRESTIMATED 51*   FREYA 9.5     Recent Labs   Lab Test 10/15/21  0847 08/20/21  1645 07/30/21  1620   CR 1.46* 2.03* 1.86*     No results for input(s): \"A1C\" in the last 93290 hours.       Recent Labs   Lab Test 08/20/21  1645   WBC 6.0   HGB 14.3   HCT 41.9   MCV 86        Recent Labs   Lab Test 08/20/21  1645 07/30/21  1621 08/10/20  1425   HGB 14.3 14.0 16.0    Recent Labs   Lab Test 08/10/20  1425   TROPONINI <0.01     Recent Labs   Lab Test 08/20/21  1645 07/30/21  1620   BNP 59* 117*     Recent Labs "   Lab Test 07/30/21  1620   TSH 2.12     Recent Labs   Lab Test 08/10/20  1425   INR 1.02          This note has been dictated using voice recognition software. Any grammatical, typographical, or context distortions are unintentional and inherent to the software    Soila Rome PA-C                                         Thank you for allowing me to participate in the care of your patient.      Sincerely,     Soila Ortiz PA-C     [facility]  cc:   Marleen Ryan MD  [address] [facility] [place] [place] [address]  [place] [address]

## 2024-09-17 DIAGNOSIS — I10 BENIGN ESSENTIAL HYPERTENSION: Primary | ICD-10-CM

## 2024-09-17 RX ORDER — HYDRALAZINE HYDROCHLORIDE 25 MG/1
25 TABLET, FILM COATED ORAL 2 TIMES DAILY
Qty: 180 TABLET | Refills: 1 | Status: SHIPPED | OUTPATIENT
Start: 2024-09-17

## 2024-10-05 ENCOUNTER — HEALTH MAINTENANCE LETTER (OUTPATIENT)
Age: 64
End: 2024-10-05

## 2024-11-01 DIAGNOSIS — I10 ESSENTIAL HYPERTENSION: ICD-10-CM

## 2024-11-01 DIAGNOSIS — I35.1 NONRHEUMATIC AORTIC VALVE INSUFFICIENCY: ICD-10-CM

## 2024-11-01 DIAGNOSIS — I10 UNCONTROLLED HYPERTENSION: ICD-10-CM

## 2024-11-01 RX ORDER — CARVEDILOL 25 MG/1
25 TABLET ORAL 2 TIMES DAILY WITH MEALS
Qty: 180 TABLET | Refills: 0 | Status: SHIPPED | OUTPATIENT
Start: 2024-11-01

## 2024-12-03 NOTE — PROGRESS NOTES
HEART CARE ENCOUNTER CONSULTATON NOTE      [facility]  [phone]      Assessment/Recommendations   Assessment:   Hypertension: Controlled on Carvedilol 25 mg twice daily and Hydralazine 25 mg twice daily   - Intolerance is to lisinopril and it in the past. Unwilling to have lab monitoring of Cr on losartan or hydrochlorothiazide combo and so started on hydralazine.  Aortic regurgitation: Mild per cardiac MRI/MRA 9/2021, no significant aortic root/ascending dilation  Dyslipidemia: Recent LDL 89. No longer taking atorvastatin 80 mg daily.  Believes this caused side effects, difficult to swallow large pill following stroke.  Hx ischemic CVA: Aspirin 81 mg daily twice weekly. Residual balance effects.  CKD 3    Plan:   Continue carvedilol 25 mg and hydralazine 25 mg twice daily  Discussed restarting statin with rosuvastatin 10 mg once daily as he is unwilling to restart atorvastatin. He wants to check coverage with his insurance, as he feels medication cost coverage is increasing. We discussed indication with history of ischemic stroke  Encouraged aspirin 81 mg daily  Recommend reestablishing or establishing with new PCP with renal dysfunction  We discussed return to regular exercise        Follow up in 1 year or sooner as needed     History of Present Illness/Subjective    HPI: Irving Baca is a 64 year old male with PMHx of regurgitation, HTN, HLD, CVA, CKD presents for follow up.  Last cardiology visit 11/2022, PCP visit in 2021. Started on hydralazine with carvedilol,    Patient is feeling well.  He is tolerating carvedilol with addition of hydralazine and blood pressure is well-controlled today.  He has been more active in the last months and feels his lower extremities are strengthening.  Has had balance issues and weakness since his stroke.  He is happy with these changes.  Occasionally feels fatigued after taking the morning hydralazine and carvedilol together.  Does not take it with  "food regularly. Does mot take furosemide or atorvastatin due to side effects of fatigue and \"wandering\" feeling.    He denies lightheadedness, shortness of breath, dyspnea on exertion, orthopnea, PND, palpitations, chest pain, and lower extremity edema.        MRA Cardiac aorta 9/2021  1.  Normal left ventricular size, wall thickness and systolic function. The quantified left ventricular  ejection fraction is 58%.  No myocardial scar is identified.    2.  Normal right ventricular size and systolic function.    3.  Tricuspid aortic valve with mild aortic regurgitation. Aortic regurgitant volume 27.8 ml. Aortic  regurgitant fraction 21.6 %.  4. No significant enlargement of thoracic aorta.      THORACIC AORTA: 3D MRA. No aortic dissection or coarctation.   Aortic root measures (sinus of Valsalva): 36x 37 x 37  mm.   Aorta at the sinotubular junction measures 32 mm.   Ascending aorta at the right pulmonary artery measures: 38 mm.   Proximal aortic arch: 35 mm   Distal aortic arch: 26 mm   Proximal descending aorta: 28mm   Distal thoracic descending aorta at level of diaphragm: 25 mm.     TTE 8/2020    Left Ventricle: Normal left ventricular size and systolic function.The estimated left ventricular ejection fraction is 55%. This represents a normal ejection fraction. Mild concentric hypertrophy noted. E/e' 8 to 15, which is equivocal for estimating   LV filling pressures.Left ventricular diastolic function is normal.    The left ventricular wall motion is normal.    Right Ventricle: Normal right ventricular size and systolic function. TAPSE is normal, which is consistent with normal right ventricular systolic function.    Aortic Valve: The valve is tricuspid. No aortic stenosis. Moderate aortic regurgitation with an eccentrically directed jet and toward mitral valve.    Thoracic Aorta: The ascending aorta is mildly dilated.    No previous study for comparison.     Physical Examination  Review of Systems   Vitals: BP " 120/70 (BP Location: Right arm, Patient Position: Sitting, Cuff Size: Adult Large)   Pulse 56   Resp 16   Wt 119.7 kg (264 lb)   SpO2 97%   BMI 36.82 kg/m    BMI= Body mass index is 36.82 kg/m .  Wt Readings from Last 3 Encounters:   12/04/24 119.7 kg (264 lb)   09/11/24 116.6 kg (257 lb)   11/14/22 112.9 kg (249 lb)           ENT/Mouth: membranes moist, no oral lesions or bleeding gums.      EYES:  no scleral icterus, normal conjunctivae       Chest/Lungs:   lungs are clear to auscultation, no rales or wheezing, equal chest wall expansion    Cardiovascular:   Regular. Normal first and second heart sounds with no murmurs, rubs, or gallops; the radial pulses are intact,  absent edema bilaterally        Extremities: no cyanosis or clubbing   Skin: no xanthelasma, warm.    Neurologic: no tremors     Psychiatric: alert and oriented x3, calm        Please refer above for cardiac ROS details.        Medical History  Surgical History Family History Social History   No past medical history on file.  No past surgical history on file.  No family history on file.     Social History     Socioeconomic History    Marital status: Single     Spouse name: Not on file    Number of children: Not on file    Years of education: Not on file    Highest education level: Not on file   Occupational History    Not on file   Tobacco Use    Smoking status: Never    Smokeless tobacco: Never   Substance and Sexual Activity    Alcohol use: Yes    Drug use: Never    Sexual activity: Not on file   Other Topics Concern    Not on file   Social History Narrative    Not on file     Social Drivers of Health     Financial Resource Strain: Not on file   Food Insecurity: Not on file   Transportation Needs: Not on file   Physical Activity: Not on file   Stress: Not on file   Social Connections: Not on file   Interpersonal Safety: Not on file   Housing Stability: Not on file           Medications  Allergies   Current Outpatient Medications   Medication  "Sig Dispense Refill    aspirin 81 MG EC tablet Take 81 mg by mouth daily as needed.      carvedilol (COREG) 25 MG tablet TAKE 1 TABLET BY MOUTH TWICE DAILY WITH MEALS 180 tablet 0    hydrALAZINE (APRESOLINE) 25 MG tablet Take 1 tablet (25 mg) by mouth 2 times daily. 180 tablet 1    atorvastatin (LIPITOR) 80 MG tablet Take 1 tablet (80 mg) by mouth At Bedtime (Patient not taking: Reported on 12/4/2024) 90 tablet 3    furosemide (LASIX) 20 MG tablet Take 1 tablet (20 mg) by mouth daily (Patient not taking: Reported on 12/4/2024) 90 tablet 3       Allergies   Allergen Reactions    Lisinopril Cough    Amlodipine Dizziness          Lab Results    Chemistry/lipid CBC Cardiac Enzymes/BNP/TSH/INR   Recent Labs   Lab Test 08/20/21  1649   CHOL 114   HDL 33*   LDL 48   TRIG 167*     Recent Labs   Lab Test 08/20/21  1649 08/11/20  0509 04/10/18  1005   LDL 48 114 108     Recent Labs   Lab Test 10/15/21  0847      POTASSIUM 4.6   CHLORIDE 103   CO2 26      BUN 22   CR 1.46*   GFRESTIMATED 51*   FREYA 9.5     Recent Labs   Lab Test 10/15/21  0847 08/20/21  1645 07/30/21  1620   CR 1.46* 2.03* 1.86*     No results for input(s): \"A1C\" in the last 39757 hours.       Recent Labs   Lab Test 08/20/21  1645   WBC 6.0   HGB 14.3   HCT 41.9   MCV 86        Recent Labs   Lab Test 08/20/21  1645 07/30/21  1621 08/10/20  1425   HGB 14.3 14.0 16.0    Recent Labs   Lab Test 08/10/20  1425   TROPONINI <0.01     Recent Labs   Lab Test 08/20/21  1645 07/30/21  1620   BNP 59* 117*     Recent Labs   Lab Test 07/30/21  1620   TSH 2.12     Recent Labs   Lab Test 08/10/20  1425   INR 1.02          This note has been dictated using voice recognition software. Any grammatical, typographical, or context distortions are unintentional and inherent to the software    Soila Rome PA-C                                       "

## 2024-12-04 ENCOUNTER — OFFICE VISIT (OUTPATIENT)
Dept: CARDIOLOGY | Facility: CLINIC | Age: 64
End: 2024-12-04
Payer: MEDICARE

## 2024-12-04 VITALS
OXYGEN SATURATION: 97 % | WEIGHT: 264 LBS | RESPIRATION RATE: 16 BRPM | HEART RATE: 56 BPM | SYSTOLIC BLOOD PRESSURE: 120 MMHG | BODY MASS INDEX: 36.82 KG/M2 | DIASTOLIC BLOOD PRESSURE: 70 MMHG

## 2024-12-04 DIAGNOSIS — I63.9 CEREBROVASCULAR ACCIDENT (CVA), UNSPECIFIED MECHANISM (H): ICD-10-CM

## 2024-12-04 DIAGNOSIS — E78.5 DYSLIPIDEMIA, GOAL LDL BELOW 100: ICD-10-CM

## 2024-12-04 DIAGNOSIS — I35.1 NONRHEUMATIC AORTIC VALVE INSUFFICIENCY: ICD-10-CM

## 2024-12-04 DIAGNOSIS — I10 BENIGN ESSENTIAL HYPERTENSION: Primary | ICD-10-CM

## 2024-12-04 PROCEDURE — 99214 OFFICE O/P EST MOD 30 MIN: CPT

## 2024-12-04 PROCEDURE — G2211 COMPLEX E/M VISIT ADD ON: HCPCS

## 2024-12-04 NOTE — PATIENT INSTRUCTIONS
It was a pleasure taking part in your care today:    - Can stagger carvedilol and hydralazine if you feel fatigued taking bother together. Should separate hydralazine doses by 6-8 hours. Can take medications with food.  - Continue current medications  - Would recommend rosuvastatin 10 mg once daily for cholesterol and stroke history. Discuss coverage with insurance  - Recommend aspirin 81 mg daily  - Follow up in 1 year with me    Please call the [facility] clinic with any questions or concerns at [phone].     Soila Rome PA-C

## 2024-12-04 NOTE — LETTER
12/4/2024    Chance Del Toro MD, MD  [address]  [facility] [address]    RE: Irving Baca       Dear Colleague,     I had the pleasure of seeing Irving Baca in the [facility].    HEART CARE ENCOUNTER CONSULTATON NOTE      [facility]  [phone]      Assessment/Recommendations   Assessment:   Hypertension: Controlled on Carvedilol 25 mg twice daily and Hydralazine 25 mg twice daily   - Intolerance is to lisinopril and it in the past. Unwilling to have lab monitoring of Cr on losartan or hydrochlorothiazide combo and so started on hydralazine.  Aortic regurgitation: Mild per cardiac MRI/MRA 9/2021, no significant aortic root/ascending dilation  Dyslipidemia: Recent LDL 89. No longer taking atorvastatin 80 mg daily.  Believes this caused side effects, difficult to swallow large pill following stroke.  Hx ischemic CVA: Aspirin 81 mg daily twice weekly. Residual balance effects.  CKD 3    Plan:   Continue carvedilol 25 mg and hydralazine 25 mg twice daily  Discussed restarting statin with rosuvastatin 10 mg once daily as he is unwilling to restart atorvastatin. He wants to check coverage with his insurance, as he feels medication cost coverage is increasing. We discussed indication with history of ischemic stroke  Encouraged aspirin 81 mg daily  Recommend reestablishing or establishing with new PCP with renal dysfunction  We discussed return to regular exercise        Follow up in 1 year or sooner as needed     History of Present Illness/Subjective    HPI: Irving Baca is a 64 year old male with PMHx of regurgitation, HTN, HLD, CVA, CKD presents for follow up.  Last cardiology visit 11/2022, PCP visit in 2021. Started on hydralazine with carvedilol,    Patient is feeling well.  He is tolerating carvedilol with addition of hydralazine and blood pressure is well-controlled today.  He has been more active in the last months and feels his lower extremities are  "strengthening.  Has had balance issues and weakness since his stroke.  He is happy with these changes.  Occasionally feels fatigued after taking the morning hydralazine and carvedilol together.  Does not take it with food regularly. Does mot take furosemide or atorvastatin due to side effects of fatigue and \"wandering\" feeling.    He denies lightheadedness, shortness of breath, dyspnea on exertion, orthopnea, PND, palpitations, chest pain, and lower extremity edema.        MRA Cardiac aorta 9/2021  1.  Normal left ventricular size, wall thickness and systolic function. The quantified left ventricular  ejection fraction is 58%.  No myocardial scar is identified.    2.  Normal right ventricular size and systolic function.    3.  Tricuspid aortic valve with mild aortic regurgitation. Aortic regurgitant volume 27.8 ml. Aortic  regurgitant fraction 21.6 %.  4. No significant enlargement of thoracic aorta.      THORACIC AORTA: 3D MRA. No aortic dissection or coarctation.   Aortic root measures (sinus of Valsalva): 36x 37 x 37  mm.   Aorta at the sinotubular junction measures 32 mm.   Ascending aorta at the right pulmonary artery measures: 38 mm.   Proximal aortic arch: 35 mm   Distal aortic arch: 26 mm   Proximal descending aorta: 28mm   Distal thoracic descending aorta at level of diaphragm: 25 mm.     TTE 8/2020    Left Ventricle: Normal left ventricular size and systolic function.The estimated left ventricular ejection fraction is 55%. This represents a normal ejection fraction. Mild concentric hypertrophy noted. E/e' 8 to 15, which is equivocal for estimating   LV filling pressures.Left ventricular diastolic function is normal.    The left ventricular wall motion is normal.    Right Ventricle: Normal right ventricular size and systolic function. TAPSE is normal, which is consistent with normal right ventricular systolic function.    Aortic Valve: The valve is tricuspid. No aortic stenosis. Moderate aortic " regurgitation with an eccentrically directed jet and toward mitral valve.    Thoracic Aorta: The ascending aorta is mildly dilated.    No previous study for comparison.     Physical Examination  Review of Systems   Vitals: /70 (BP Location: Right arm, Patient Position: Sitting, Cuff Size: Adult Large)   Pulse 56   Resp 16   Wt 119.7 kg (264 lb)   SpO2 97%   BMI 36.82 kg/m    BMI= Body mass index is 36.82 kg/m .  Wt Readings from Last 3 Encounters:   12/04/24 119.7 kg (264 lb)   09/11/24 116.6 kg (257 lb)   11/14/22 112.9 kg (249 lb)           ENT/Mouth: membranes moist, no oral lesions or bleeding gums.      EYES:  no scleral icterus, normal conjunctivae       Chest/Lungs:   lungs are clear to auscultation, no rales or wheezing, equal chest wall expansion    Cardiovascular:   Regular. Normal first and second heart sounds with no murmurs, rubs, or gallops; the radial pulses are intact,  absent edema bilaterally        Extremities: no cyanosis or clubbing   Skin: no xanthelasma, warm.    Neurologic: no tremors     Psychiatric: alert and oriented x3, calm        Please refer above for cardiac ROS details.        Medical History  Surgical History Family History Social History   No past medical history on file.  No past surgical history on file.  No family history on file.     Social History     Socioeconomic History     Marital status: Single     Spouse name: Not on file     Number of children: Not on file     Years of education: Not on file     Highest education level: Not on file   Occupational History     Not on file   Tobacco Use     Smoking status: Never     Smokeless tobacco: Never   Substance and Sexual Activity     Alcohol use: Yes     Drug use: Never     Sexual activity: Not on file   Other Topics Concern     Not on file   Social History Narrative     Not on file     Social Drivers of Health     Financial Resource Strain: Not on file   Food Insecurity: Not on file   Transportation Needs: Not on file  "  Physical Activity: Not on file   Stress: Not on file   Social Connections: Not on file   Interpersonal Safety: Not on file   Housing Stability: Not on file           Medications  Allergies   Current Outpatient Medications   Medication Sig Dispense Refill     aspirin 81 MG EC tablet Take 81 mg by mouth daily as needed.       carvedilol (COREG) 25 MG tablet TAKE 1 TABLET BY MOUTH TWICE DAILY WITH MEALS 180 tablet 0     hydrALAZINE (APRESOLINE) 25 MG tablet Take 1 tablet (25 mg) by mouth 2 times daily. 180 tablet 1     atorvastatin (LIPITOR) 80 MG tablet Take 1 tablet (80 mg) by mouth At Bedtime (Patient not taking: Reported on 12/4/2024) 90 tablet 3     furosemide (LASIX) 20 MG tablet Take 1 tablet (20 mg) by mouth daily (Patient not taking: Reported on 12/4/2024) 90 tablet 3       Allergies   Allergen Reactions     Lisinopril Cough     Amlodipine Dizziness          Lab Results    Chemistry/lipid CBC Cardiac Enzymes/BNP/TSH/INR   Recent Labs   Lab Test 08/20/21  1649   CHOL 114   HDL 33*   LDL 48   TRIG 167*     Recent Labs   Lab Test 08/20/21  1649 08/11/20  0509 04/10/18  1005   LDL 48 114 108     Recent Labs   Lab Test 10/15/21  0847      POTASSIUM 4.6   CHLORIDE 103   CO2 26      BUN 22   CR 1.46*   GFRESTIMATED 51*   FREYA 9.5     Recent Labs   Lab Test 10/15/21  0847 08/20/21  1645 07/30/21  1620   CR 1.46* 2.03* 1.86*     No results for input(s): \"A1C\" in the last 46404 hours.       Recent Labs   Lab Test 08/20/21  1645   WBC 6.0   HGB 14.3   HCT 41.9   MCV 86        Recent Labs   Lab Test 08/20/21  1645 07/30/21  1621 08/10/20  1425   HGB 14.3 14.0 16.0    Recent Labs   Lab Test 08/10/20  1425   TROPONINI <0.01     Recent Labs   Lab Test 08/20/21  1645 07/30/21  1620   BNP 59* 117*     Recent Labs   Lab Test 07/30/21  1620   TSH 2.12     Recent Labs   Lab Test 08/10/20  1425   INR 1.02          This note has been dictated using voice recognition software. Any grammatical, typographical, or " context distortions are unintentional and inherent to the software    Soila Rome PA-C                                         Thank you for allowing me to participate in the care of your patient.      Sincerely,     Soila Ortiz PA-C     [facility]  cc:   Soila Rome PA-C  [address]  [place] [address]

## 2024-12-08 DIAGNOSIS — I35.1 NONRHEUMATIC AORTIC VALVE INSUFFICIENCY: ICD-10-CM

## 2024-12-08 DIAGNOSIS — I10 ESSENTIAL HYPERTENSION: ICD-10-CM

## 2024-12-08 DIAGNOSIS — I10 UNCONTROLLED HYPERTENSION: ICD-10-CM

## 2024-12-09 RX ORDER — CARVEDILOL 25 MG/1
25 TABLET ORAL 2 TIMES DAILY WITH MEALS
Qty: 180 TABLET | Refills: 2 | Status: SHIPPED | OUTPATIENT
Start: 2024-12-09

## 2025-02-05 DIAGNOSIS — I10 BENIGN ESSENTIAL HYPERTENSION: ICD-10-CM

## 2025-02-05 RX ORDER — HYDRALAZINE HYDROCHLORIDE 25 MG/1
25 TABLET, FILM COATED ORAL 2 TIMES DAILY
Qty: 180 TABLET | Refills: 2 | Status: SHIPPED | OUTPATIENT
Start: 2025-02-05